# Patient Record
Sex: MALE | Race: WHITE | Employment: OTHER | URBAN - METROPOLITAN AREA
[De-identification: names, ages, dates, MRNs, and addresses within clinical notes are randomized per-mention and may not be internally consistent; named-entity substitution may affect disease eponyms.]

---

## 2019-07-10 ENCOUNTER — ANESTHESIA EVENT (OUTPATIENT)
Dept: SURGERY | Age: 84
End: 2019-07-10
Payer: MEDICARE

## 2019-07-10 NOTE — H&P
Admission History and Physical      Impression:     - Closed bicondylar fracture of left distal humerus      Plan:     - Left distal humerus open reduction and internal fixation vs. Total elbow replacement      There is no problem list on file for this patient. HPI:     Jessie Lin is a 80 y.o. male who has been followed at [address] [place] [address] and [address] [facility]  by Dr Susan Sneed. Pt reports onset of left elbow pain after a fall on 7/8/19. He experienced immediate pain. He was seen in the ED immediately after injury. Workup included radiographs which revealed bicondylar distal humerus fracture. Patient is an appropriate candidate for open reduction and internal fixation versus total elbow replacement. Risks, benefits, alternatives and outcomes of surgical management were discussed with the patient. Risks include but not limited to risk of infection, blood clots, blood loss, damage to surrounding blood vessels and nerves, continued pain and stiffness or loss of limb or life. Patient stated understanding and wished to proceed. Appropriate informed consent was obtained and arrangements were made for elective surgery. Past Medical History:   Diagnosis Date    Diabetes ([facility] [address] 2001    Hypertension 2003     Past Surgical History:   Procedure Laterality Date    HX HERNIA REPAIR Bilateral     HX TONSILLECTOMY         No family history on file. No Known Allergies      Review of Systems:   Constitutional: Negative for fever. Eyes: Negative for visual changes. Respiratory:  Negative for shortness of breath. Cardiovascular: Negative for chest pain and palpitations. Gastrointestinal: Negative for dysphagia   Genitourinary: Negative for hematuria and dysuria. Musculoskeletal: See HPI  Neurological: grossly intact. Denies Lower extremity parasthesias. Physical Assessment:   There were no vitals taken for this visit.     General:  Pt is a well developed 80 y.o. male who appears stated age.  HEENT:   Head is atraumatic and normocephalic. Neck:  Trachea is midline. No lymphadenopathy noted. Abd:  Soft, non-tender, positive bowel sounds. Lower Extremities: No edema, No calf tenderness or erythema, No evidence of DVT  Left elbow: Elbow is moderately tender with generalized edema. Range of motion is limited. Diffuse weakness present. CBCw/Diff  No results for input(s): WBC, RBC, HCT, MCV, MCH, MCHC, RDW, HCTEXT, HCTEXT in the last 72 hours. No lab exists for component: HEMOGLOBIN, PLATELET, MPV  No results for input(s): BANDS, LYMPHOCYTES, MONOS, EOS, BASOS, PROMYELOCYTE, BLAST, RDW in the last 72 hours. No lab exists for component: RELNEU, RELLYM, RELMONO, RELEOS, RELBAS, SEGS, MYELOCYTE, META    Urinalysis  No results for input(s): UGLU in the last 72 hours.     No lab exists for component: USPG, UPH, UPSC, UKET, UOCB, UNITR, [place], UUROBILISQ, [place], [place], [facility]

## 2019-07-11 ENCOUNTER — APPOINTMENT (OUTPATIENT)
Dept: GENERAL RADIOLOGY | Age: 84
End: 2019-07-11
Attending: ORTHOPAEDIC SURGERY
Payer: MEDICARE

## 2019-07-11 ENCOUNTER — HOSPITAL ENCOUNTER (OUTPATIENT)
Age: 84
Discharge: NURSING, REHAB OR LONG-TERM CARE | End: 2019-07-15
Attending: ORTHOPAEDIC SURGERY | Admitting: ORTHOPAEDIC SURGERY
Payer: MEDICARE

## 2019-07-11 ENCOUNTER — ANESTHESIA (OUTPATIENT)
Dept: SURGERY | Age: 84
End: 2019-07-11
Payer: MEDICARE

## 2019-07-11 ENCOUNTER — APPOINTMENT (OUTPATIENT)
Dept: GENERAL RADIOLOGY | Age: 84
End: 2019-07-11
Attending: PHYSICIAN ASSISTANT
Payer: MEDICARE

## 2019-07-11 DIAGNOSIS — S42.492D CLOSED BICONDYLAR FRACTURE OF DISTAL END OF LEFT HUMERUS WITH ROUTINE HEALING, SUBSEQUENT ENCOUNTER: Primary | ICD-10-CM

## 2019-07-11 PROBLEM — S42.309A HUMERUS FRACTURE: Status: ACTIVE | Noted: 2019-07-11

## 2019-07-11 LAB
ABO + RH BLD: NORMAL
BLOOD GROUP ANTIBODIES SERPL: NORMAL
EST. AVERAGE GLUCOSE BLD GHB EST-MCNC: 154 MG/DL
GLUCOSE BLD STRIP.AUTO-MCNC: 100 MG/DL (ref 70–110)
GLUCOSE BLD STRIP.AUTO-MCNC: 137 MG/DL (ref 70–110)
GLUCOSE BLD STRIP.AUTO-MCNC: 140 MG/DL (ref 70–110)
GLUCOSE BLD STRIP.AUTO-MCNC: 209 MG/DL (ref 70–110)
HBA1C MFR BLD: 7 % (ref 4.2–5.6)
SPECIMEN EXP DATE BLD: NORMAL

## 2019-07-11 PROCEDURE — 74011250636 HC RX REV CODE- 250/636

## 2019-07-11 PROCEDURE — 36415 COLL VENOUS BLD VENIPUNCTURE: CPT

## 2019-07-11 PROCEDURE — 77030018673: Performed by: ORTHOPAEDIC SURGERY

## 2019-07-11 PROCEDURE — 77030020782 HC GWN BAIR PAWS FLX 3M -B: Performed by: ORTHOPAEDIC SURGERY

## 2019-07-11 PROCEDURE — 73070 X-RAY EXAM OF ELBOW: CPT

## 2019-07-11 PROCEDURE — 77030012508 HC MSK AIRWY LMA AMBU -A: Performed by: ANESTHESIOLOGY

## 2019-07-11 PROCEDURE — 77030018835 HC SOL IRR LR ICUM -A: Performed by: ORTHOPAEDIC SURGERY

## 2019-07-11 PROCEDURE — 74011250636 HC RX REV CODE- 250/636: Performed by: ORTHOPAEDIC SURGERY

## 2019-07-11 PROCEDURE — 77030018790 HC NDL SUT ASPE -A: Performed by: ORTHOPAEDIC SURGERY

## 2019-07-11 PROCEDURE — 99218 HC RM OBSERVATION: CPT

## 2019-07-11 PROCEDURE — 77030020813 HC INST SCULP CEM KT DISP S&N -B: Performed by: ORTHOPAEDIC SURGERY

## 2019-07-11 PROCEDURE — 77030002933 HC SUT MCRYL J&J -A: Performed by: ORTHOPAEDIC SURGERY

## 2019-07-11 PROCEDURE — 74011636637 HC RX REV CODE- 636/637: Performed by: ANESTHESIOLOGY

## 2019-07-11 PROCEDURE — 77030011628: Performed by: ORTHOPAEDIC SURGERY

## 2019-07-11 PROCEDURE — 74011000250 HC RX REV CODE- 250: Performed by: ORTHOPAEDIC SURGERY

## 2019-07-11 PROCEDURE — 77030020753 HC CUF TRNQT 1BLA STRY -B: Performed by: ORTHOPAEDIC SURGERY

## 2019-07-11 PROCEDURE — 74011250636 HC RX REV CODE- 250/636: Performed by: ANESTHESIOLOGY

## 2019-07-11 PROCEDURE — 82962 GLUCOSE BLOOD TEST: CPT

## 2019-07-11 PROCEDURE — 77030020268 HC MISC GENERAL SUPPLY: Performed by: ORTHOPAEDIC SURGERY

## 2019-07-11 PROCEDURE — 77030002922 HC SUT FBRWRE ARTH -B: Performed by: ORTHOPAEDIC SURGERY

## 2019-07-11 PROCEDURE — 77030013079 HC BLNKT BAIR HGGR 3M -A: Performed by: ANESTHESIOLOGY

## 2019-07-11 PROCEDURE — 76010000131 HC OR TIME 2 TO 2.5 HR: Performed by: ORTHOPAEDIC SURGERY

## 2019-07-11 PROCEDURE — 74011250637 HC RX REV CODE- 250/637: Performed by: PHYSICIAN ASSISTANT

## 2019-07-11 PROCEDURE — 77030031139 HC SUT VCRL2 J&J -A: Performed by: ORTHOPAEDIC SURGERY

## 2019-07-11 PROCEDURE — 77030027138 HC INCENT SPIROMETER -A: Performed by: ORTHOPAEDIC SURGERY

## 2019-07-11 PROCEDURE — 76210000016 HC OR PH I REC 1 TO 1.5 HR: Performed by: ORTHOPAEDIC SURGERY

## 2019-07-11 PROCEDURE — 77030020269 HC MISC IMPL: Performed by: ORTHOPAEDIC SURGERY

## 2019-07-11 PROCEDURE — C1776 JOINT DEVICE (IMPLANTABLE): HCPCS | Performed by: ORTHOPAEDIC SURGERY

## 2019-07-11 PROCEDURE — 76060000035 HC ANESTHESIA 2 TO 2.5 HR: Performed by: ORTHOPAEDIC SURGERY

## 2019-07-11 PROCEDURE — 74011250636 HC RX REV CODE- 250/636: Performed by: PHYSICIAN ASSISTANT

## 2019-07-11 PROCEDURE — 77030032490 HC SLV COMPR SCD KNE COVD -B: Performed by: ORTHOPAEDIC SURGERY

## 2019-07-11 PROCEDURE — 77030013708 HC HNDPC SUC IRR PULS STRY –B: Performed by: ORTHOPAEDIC SURGERY

## 2019-07-11 PROCEDURE — 77030014007 HC SPNG HEMSTAT J&J -B: Performed by: ORTHOPAEDIC SURGERY

## 2019-07-11 PROCEDURE — C1713 ANCHOR/SCREW BN/BN,TIS/BN: HCPCS | Performed by: ORTHOPAEDIC SURGERY

## 2019-07-11 PROCEDURE — 77030004402 HC BUR NEUR STRY -C: Performed by: ORTHOPAEDIC SURGERY

## 2019-07-11 PROCEDURE — 77030006773 HC BLD SAW OSC BRSM -A: Performed by: ORTHOPAEDIC SURGERY

## 2019-07-11 PROCEDURE — 83036 HEMOGLOBIN GLYCOSYLATED A1C: CPT

## 2019-07-11 PROCEDURE — 77030003029 HC SUT VCRL J&J -B: Performed by: ORTHOPAEDIC SURGERY

## 2019-07-11 PROCEDURE — 86900 BLOOD TYPING SEROLOGIC ABO: CPT

## 2019-07-11 PROCEDURE — 77030008462 HC STPLR SKN PROX J&J -A: Performed by: ORTHOPAEDIC SURGERY

## 2019-07-11 DEVICE — IMPLANTABLE DEVICE: Type: IMPLANTABLE DEVICE | Site: ELBOW | Status: FUNCTIONAL

## 2019-07-11 RX ORDER — FACIAL-BODY WIPES
10 EACH TOPICAL DAILY PRN
Status: DISCONTINUED | OUTPATIENT
Start: 2019-07-11 | End: 2019-07-15 | Stop reason: HOSPADM

## 2019-07-11 RX ORDER — NALOXONE HYDROCHLORIDE 0.4 MG/ML
0.4 INJECTION, SOLUTION INTRAMUSCULAR; INTRAVENOUS; SUBCUTANEOUS AS NEEDED
Status: DISCONTINUED | OUTPATIENT
Start: 2019-07-11 | End: 2019-07-15 | Stop reason: HOSPADM

## 2019-07-11 RX ORDER — CHOLECALCIFEROL TAB 125 MCG (5000 UNIT) 125 MCG
5000 TAB ORAL DAILY
COMMUNITY

## 2019-07-11 RX ORDER — LIDOCAINE HYDROCHLORIDE 20 MG/ML
INJECTION, SOLUTION EPIDURAL; INFILTRATION; INTRACAUDAL; PERINEURAL AS NEEDED
Status: DISCONTINUED | OUTPATIENT
Start: 2019-07-11 | End: 2019-07-11 | Stop reason: HOSPADM

## 2019-07-11 RX ORDER — FINASTERIDE 5 MG/1
5 TABLET, FILM COATED ORAL DAILY
Status: DISCONTINUED | OUTPATIENT
Start: 2019-07-12 | End: 2019-07-14

## 2019-07-11 RX ORDER — ONDANSETRON 2 MG/ML
INJECTION INTRAMUSCULAR; INTRAVENOUS AS NEEDED
Status: DISCONTINUED | OUTPATIENT
Start: 2019-07-11 | End: 2019-07-11 | Stop reason: HOSPADM

## 2019-07-11 RX ORDER — HYDROMORPHONE HYDROCHLORIDE 2 MG/ML
0.5 INJECTION, SOLUTION INTRAMUSCULAR; INTRAVENOUS; SUBCUTANEOUS
Status: DISCONTINUED | OUTPATIENT
Start: 2019-07-11 | End: 2019-07-11 | Stop reason: HOSPADM

## 2019-07-11 RX ORDER — HYDROMORPHONE HYDROCHLORIDE 2 MG/ML
INJECTION, SOLUTION INTRAMUSCULAR; INTRAVENOUS; SUBCUTANEOUS AS NEEDED
Status: DISCONTINUED | OUTPATIENT
Start: 2019-07-11 | End: 2019-07-11 | Stop reason: HOSPADM

## 2019-07-11 RX ORDER — LOSARTAN POTASSIUM 25 MG/1
25 TABLET ORAL
Status: DISCONTINUED | OUTPATIENT
Start: 2019-07-12 | End: 2019-07-14

## 2019-07-11 RX ORDER — SODIUM CHLORIDE 0.9 % (FLUSH) 0.9 %
5-40 SYRINGE (ML) INJECTION AS NEEDED
Status: DISCONTINUED | OUTPATIENT
Start: 2019-07-11 | End: 2019-07-15 | Stop reason: HOSPADM

## 2019-07-11 RX ORDER — SODIUM CHLORIDE, SODIUM LACTATE, POTASSIUM CHLORIDE, CALCIUM CHLORIDE 600; 310; 30; 20 MG/100ML; MG/100ML; MG/100ML; MG/100ML
100 INJECTION, SOLUTION INTRAVENOUS CONTINUOUS
Status: DISCONTINUED | OUTPATIENT
Start: 2019-07-11 | End: 2019-07-15 | Stop reason: HOSPADM

## 2019-07-11 RX ORDER — INSULIN LISPRO 100 [IU]/ML
INJECTION, SOLUTION INTRAVENOUS; SUBCUTANEOUS ONCE
Status: COMPLETED | OUTPATIENT
Start: 2019-07-11 | End: 2019-07-11

## 2019-07-11 RX ORDER — MULTIVITAMIN
1 TABLET ORAL
COMMUNITY

## 2019-07-11 RX ORDER — SODIUM CHLORIDE 0.9 % (FLUSH) 0.9 %
5-40 SYRINGE (ML) INJECTION AS NEEDED
Status: DISCONTINUED | OUTPATIENT
Start: 2019-07-11 | End: 2019-07-11 | Stop reason: HOSPADM

## 2019-07-11 RX ORDER — CEFAZOLIN SODIUM/WATER 2 G/20 ML
2 SYRINGE (ML) INTRAVENOUS ONCE
Status: COMPLETED | OUTPATIENT
Start: 2019-07-11 | End: 2019-07-11

## 2019-07-11 RX ORDER — SODIUM CHLORIDE 0.9 % (FLUSH) 0.9 %
5-40 SYRINGE (ML) INJECTION EVERY 8 HOURS
Status: DISCONTINUED | OUTPATIENT
Start: 2019-07-11 | End: 2019-07-15 | Stop reason: HOSPADM

## 2019-07-11 RX ORDER — FENTANYL CITRATE 50 UG/ML
INJECTION, SOLUTION INTRAMUSCULAR; INTRAVENOUS AS NEEDED
Status: DISCONTINUED | OUTPATIENT
Start: 2019-07-11 | End: 2019-07-11 | Stop reason: HOSPADM

## 2019-07-11 RX ORDER — LANOLIN ALCOHOL/MO/W.PET/CERES
1000 CREAM (GRAM) TOPICAL DAILY
COMMUNITY

## 2019-07-11 RX ORDER — SODIUM CHLORIDE, SODIUM LACTATE, POTASSIUM CHLORIDE, CALCIUM CHLORIDE 600; 310; 30; 20 MG/100ML; MG/100ML; MG/100ML; MG/100ML
125 INJECTION, SOLUTION INTRAVENOUS CONTINUOUS
Status: DISCONTINUED | OUTPATIENT
Start: 2019-07-11 | End: 2019-07-11 | Stop reason: HOSPADM

## 2019-07-11 RX ORDER — SODIUM CHLORIDE, SODIUM LACTATE, POTASSIUM CHLORIDE, CALCIUM CHLORIDE 600; 310; 30; 20 MG/100ML; MG/100ML; MG/100ML; MG/100ML
125 INJECTION, SOLUTION INTRAVENOUS CONTINUOUS
Status: DISCONTINUED | OUTPATIENT
Start: 2019-07-11 | End: 2019-07-15 | Stop reason: HOSPADM

## 2019-07-11 RX ORDER — PROPOFOL 10 MG/ML
INJECTION, EMULSION INTRAVENOUS AS NEEDED
Status: DISCONTINUED | OUTPATIENT
Start: 2019-07-11 | End: 2019-07-11 | Stop reason: HOSPADM

## 2019-07-11 RX ORDER — POLYETHYLENE GLYCOL 3350 17 G/17G
17 POWDER, FOR SOLUTION ORAL DAILY
Status: DISCONTINUED | OUTPATIENT
Start: 2019-07-12 | End: 2019-07-15 | Stop reason: HOSPADM

## 2019-07-11 RX ORDER — INSULIN LISPRO 100 [IU]/ML
1-6 INJECTION, SOLUTION INTRAVENOUS; SUBCUTANEOUS
Status: DISCONTINUED | OUTPATIENT
Start: 2019-07-11 | End: 2019-07-12

## 2019-07-11 RX ORDER — OXYCODONE AND ACETAMINOPHEN 5; 325 MG/1; MG/1
1-2 TABLET ORAL
Status: DISCONTINUED | OUTPATIENT
Start: 2019-07-11 | End: 2019-07-14

## 2019-07-11 RX ORDER — DIPHENHYDRAMINE HCL 25 MG
25 CAPSULE ORAL
Status: DISCONTINUED | OUTPATIENT
Start: 2019-07-11 | End: 2019-07-14

## 2019-07-11 RX ORDER — SODIUM CHLORIDE 0.9 % (FLUSH) 0.9 %
5-40 SYRINGE (ML) INJECTION EVERY 8 HOURS
Status: DISCONTINUED | OUTPATIENT
Start: 2019-07-11 | End: 2019-07-11 | Stop reason: HOSPADM

## 2019-07-11 RX ORDER — CEFAZOLIN SODIUM/WATER 2 G/20 ML
2 SYRINGE (ML) INTRAVENOUS EVERY 8 HOURS
Status: COMPLETED | OUTPATIENT
Start: 2019-07-11 | End: 2019-07-12

## 2019-07-11 RX ORDER — ATORVASTATIN CALCIUM 10 MG/1
10 TABLET, FILM COATED ORAL
Status: DISCONTINUED | OUTPATIENT
Start: 2019-07-11 | End: 2019-07-14

## 2019-07-11 RX ORDER — MAGNESIUM SULFATE 100 %
4 CRYSTALS MISCELLANEOUS AS NEEDED
Status: DISCONTINUED | OUTPATIENT
Start: 2019-07-11 | End: 2019-07-15 | Stop reason: HOSPADM

## 2019-07-11 RX ORDER — ASPIRIN 81 MG/1
81 TABLET ORAL DAILY
Status: DISCONTINUED | OUTPATIENT
Start: 2019-07-12 | End: 2019-07-14

## 2019-07-11 RX ORDER — HYDROMORPHONE HYDROCHLORIDE 1 MG/ML
.2-.5 INJECTION, SOLUTION INTRAMUSCULAR; INTRAVENOUS; SUBCUTANEOUS
Status: DISCONTINUED | OUTPATIENT
Start: 2019-07-11 | End: 2019-07-14

## 2019-07-11 RX ADMIN — Medication 2 G: at 16:07

## 2019-07-11 RX ADMIN — SODIUM CHLORIDE, SODIUM LACTATE, POTASSIUM CHLORIDE, AND CALCIUM CHLORIDE: 600; 310; 30; 20 INJECTION, SOLUTION INTRAVENOUS at 16:20

## 2019-07-11 RX ADMIN — SODIUM CHLORIDE, SODIUM LACTATE, POTASSIUM CHLORIDE, AND CALCIUM CHLORIDE 100 ML/HR: 600; 310; 30; 20 INJECTION, SOLUTION INTRAVENOUS at 21:36

## 2019-07-11 RX ADMIN — FENTANYL CITRATE 100 MCG: 50 INJECTION, SOLUTION INTRAMUSCULAR; INTRAVENOUS at 16:13

## 2019-07-11 RX ADMIN — INSULIN LISPRO 4 UNITS: 100 INJECTION, SOLUTION INTRAVENOUS; SUBCUTANEOUS at 14:32

## 2019-07-11 RX ADMIN — ONDANSETRON 4 MG: 2 INJECTION INTRAMUSCULAR; INTRAVENOUS at 16:15

## 2019-07-11 RX ADMIN — PROPOFOL 150 MG: 10 INJECTION, EMULSION INTRAVENOUS at 16:13

## 2019-07-11 RX ADMIN — ATORVASTATIN CALCIUM 10 MG: 10 TABLET, FILM COATED ORAL at 22:54

## 2019-07-11 RX ADMIN — PROMETHAZINE HYDROCHLORIDE 12.5 MG: 25 INJECTION INTRAMUSCULAR; INTRAVENOUS at 21:35

## 2019-07-11 RX ADMIN — OXYCODONE HYDROCHLORIDE AND ACETAMINOPHEN 1 TABLET: 5; 325 TABLET ORAL at 21:10

## 2019-07-11 RX ADMIN — LIDOCAINE HYDROCHLORIDE 60 MG: 20 INJECTION, SOLUTION EPIDURAL; INFILTRATION; INTRACAUDAL; PERINEURAL at 16:13

## 2019-07-11 RX ADMIN — SODIUM CHLORIDE, SODIUM LACTATE, POTASSIUM CHLORIDE, AND CALCIUM CHLORIDE 125 ML/HR: 600; 310; 30; 20 INJECTION, SOLUTION INTRAVENOUS at 13:41

## 2019-07-11 RX ADMIN — HYDROMORPHONE HYDROCHLORIDE 1 MG: 2 INJECTION, SOLUTION INTRAMUSCULAR; INTRAVENOUS; SUBCUTANEOUS at 16:13

## 2019-07-11 RX ADMIN — Medication 2 G: at 21:35

## 2019-07-11 NOTE — PERIOP NOTES
Patient taken to recovery with sock from brace in clear bag with patient label. Patient's brace that he was wearing taken to recovery with patient.

## 2019-07-11 NOTE — PERIOP NOTES
Spoke with Merlin Redbird and let her know patient had a blood sugar of 209 and that the patient had 12 units of Lantus at 0800 today (7/11/19). Paged Dr. Rodriguez Poster but no call back. Zeina Krueger states she will address this matter with Dr. Jennifer Garvin.

## 2019-07-11 NOTE — ANESTHESIA POSTPROCEDURE EVALUATION
Post-Anesthesia Evaluation and Assessment    Cardiovascular Function/Vital Signs  Visit Vitals  /66   Pulse 95   Temp 36.7 °C (98.1 °F)   Resp 13   Ht 5' 7\" (1.702 m)   Wt 70.3 kg (155 lb)   SpO2 100%   BMI 24.28 kg/m²       Patient is status post Procedure(s):  LEFT TOTAL ELBOW REPLACEMENT WITH C-ARM,  \"SPEC POP\". Nausea/Vomiting: Controlled. Postoperative hydration reviewed and adequate. Pain:  Pain Scale 1: Numeric (0 - 10) (07/11/19 1826)  Pain Intensity 1: 0 (07/11/19 1826)   Managed. Neurological Status:   Neuro (WDL): Exceptions to WDL (07/11/19 1826)   At baseline. Mental Status and Level of Consciousness: Baseline and stable. Pulmonary Status:   O2 Device: Nasal cannula (07/11/19 1835)   Adequate oxygenation and airway patent. Complications related to anesthesia: None    Post-anesthesia assessment completed. No concerns. Patient has met all discharge requirements.     Signed By: Jo-Ann Sifuentes MD

## 2019-07-11 NOTE — PERIOP NOTES
Reviewed PTA medication list with patient/caregiver and patient/caregiver denies any additional medications. Patient admits to having a responsible adult care for them at home for at least 24 hours after surgery. Patient denies jevon chewing/swallowing difficulties. Patient encouraged to use Jhonathan paws warming system and informed that using Jhonathan paws to regulate body temperature prior to a procedure has been shown to help reduce the risks of blood clots and infection. Dual skin assessment & fall risk band verification completed with Jonathan Blackman RN.

## 2019-07-11 NOTE — PERIOP NOTES
Attempted to update family on room assignment. Family member did not answer phone. Unable to leave voicemail because inbox was full.

## 2019-07-11 NOTE — PERIOP NOTES
TRANSFER - OUT REPORT:    Verbal report given to [facility] on Marley Curiel  being transferred to [address] [place] [address] for routine post - op       Report consisted of patients Situation, Background, Assessment and   Recommendations(SBAR). Information from the following report(s) SBAR and MAR was reviewed with the receiving nurse. Opportunity for questions and clarification was provided.       Patient transported with:   O2 @ 2 liters  Registered Nurse  [facility]

## 2019-07-11 NOTE — BRIEF OP NOTE
BRIEF OPERATIVE NOTE    Date of Procedure: 7/11/2019   Preoperative Diagnosis: UNSPECIFIED FRACTURE OF LOWER END OF LEFT HUMERUS,INITIAL ENCOUNTER FOR CLOSED FRACTURE  Postoperative Diagnosis: UNSPECIFIED FRACTURE OF LOWER END OF LEFT HUMERUS,INITIAL ENCOUNTER FOR CLOSED FRACTURE    Procedure(s):  LEFT TOTAL ELBOW REPLACEMENT WITH C-ARM,  \"SPEC POP\"  Surgeon(s) and Role:     Johnny Chisholm MD - Primary         Surgical Assistant: kami    Surgical Staff:  Circ-1: Magdiel Doan  Physician Assistant: Hong Webb [facility]  Radiology Technician: Meeta Gallegos Tech-1: Sebastien Feliciano  Scrub Tech-2: Elmira Carrasco [facility] Staff: Jay Cleary; Zuly CRAIG  Event Time In Time Out   Incision Start 1636    Incision Close       Anesthesia: General   Estimated Blood Loss: 50ml  Specimens: * No specimens in log *   Findings: comminuted fracture   Complications: none  Implants:   Implant Name Type Inv.  Item Serial No.  Lot No. LRB No. Used Action   Refobacin Bone Cement R     [employer] [id number] Left 1 Implanted   ASSEMBLY ULNA 3IN XS RT --  - [id number]  ASSEMBLY ULNA 3IN XS RT --   [employer] [id number] Left 1 Implanted   HUM ASSEMB XS 4IN --  - [id number]  HUM ASSEMB XS 4IN --  [id number] [employer]  Left 1 Implanted

## 2019-07-11 NOTE — ANESTHESIA PREPROCEDURE EVALUATION
Relevant Problems   No relevant active problems       Anesthetic History   No history of anesthetic complications            Review of Systems / Medical History  Patient summary reviewed, nursing notes reviewed and pertinent labs reviewed    Pulmonary  Within defined limits                 Neuro/Psych   Within defined limits           Cardiovascular    Hypertension: well controlled              Exercise tolerance: >4 METS     GI/Hepatic/Renal  Within defined limits           Pertinent negatives: No liver disease and renal disease   Endo/Other    Diabetes: well controlled    Blood dyscrasia     Other Findings   Comments: 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

## 2019-07-12 PROBLEM — S42.492A CLOSED BICONDYLAR FRACTURE OF DISTAL END OF LEFT HUMERUS: Status: ACTIVE | Noted: 2019-07-12

## 2019-07-12 LAB
ANION GAP SERPL CALC-SCNC: 4 MMOL/L (ref 3–18)
BASOPHILS # BLD: 0 K/UL (ref 0–0.1)
BASOPHILS NFR BLD: 0 % (ref 0–2)
BUN SERPL-MCNC: 23 MG/DL (ref 7–18)
BUN/CREAT SERPL: 25 (ref 12–20)
CALCIUM SERPL-MCNC: 7.9 MG/DL (ref 8.5–10.1)
CHLORIDE SERPL-SCNC: 102 MMOL/L (ref 100–108)
CO2 SERPL-SCNC: 29 MMOL/L (ref 21–32)
CREAT SERPL-MCNC: 0.92 MG/DL (ref 0.6–1.3)
DIFFERENTIAL METHOD BLD: ABNORMAL
EOSINOPHIL # BLD: 0 K/UL (ref 0–0.4)
EOSINOPHIL NFR BLD: 0 % (ref 0–5)
ERYTHROCYTE [DISTWIDTH] IN BLOOD BY AUTOMATED COUNT: 12.4 % (ref 11.6–14.5)
GLUCOSE BLD STRIP.AUTO-MCNC: 188 MG/DL (ref 70–110)
GLUCOSE BLD STRIP.AUTO-MCNC: 206 MG/DL (ref 70–110)
GLUCOSE BLD STRIP.AUTO-MCNC: 301 MG/DL (ref 70–110)
GLUCOSE BLD STRIP.AUTO-MCNC: 338 MG/DL (ref 70–110)
GLUCOSE SERPL-MCNC: 289 MG/DL (ref 74–99)
HCT VFR BLD AUTO: 24.3 % (ref 36–48)
HGB BLD-MCNC: 8.2 G/DL (ref 13–16)
LYMPHOCYTES # BLD: 0.3 K/UL (ref 0.9–3.6)
LYMPHOCYTES NFR BLD: 7 % (ref 21–52)
MCH RBC QN AUTO: 32.2 PG (ref 24–34)
MCHC RBC AUTO-ENTMCNC: 33.7 G/DL (ref 31–37)
MCV RBC AUTO: 95.3 FL (ref 74–97)
MONOCYTES # BLD: 0.4 K/UL (ref 0.05–1.2)
MONOCYTES NFR BLD: 8 % (ref 3–10)
NEUTS SEG # BLD: 4.3 K/UL (ref 1.8–8)
NEUTS SEG NFR BLD: 85 % (ref 40–73)
PLATELET # BLD AUTO: 104 K/UL (ref 135–420)
PMV BLD AUTO: 9.2 FL (ref 9.2–11.8)
POTASSIUM SERPL-SCNC: 4.3 MMOL/L (ref 3.5–5.5)
RBC # BLD AUTO: 2.55 M/UL (ref 4.7–5.5)
SODIUM SERPL-SCNC: 135 MMOL/L (ref 136–145)
WBC # BLD AUTO: 5 K/UL (ref 4.6–13.2)

## 2019-07-12 PROCEDURE — 97530 THERAPEUTIC ACTIVITIES: CPT

## 2019-07-12 PROCEDURE — 77030032490 HC SLV COMPR SCD KNE COVD -B

## 2019-07-12 PROCEDURE — 74011250636 HC RX REV CODE- 250/636: Performed by: PHYSICIAN ASSISTANT

## 2019-07-12 PROCEDURE — 97110 THERAPEUTIC EXERCISES: CPT

## 2019-07-12 PROCEDURE — 99218 HC RM OBSERVATION: CPT

## 2019-07-12 PROCEDURE — 97167 OT EVAL HIGH COMPLEX 60 MIN: CPT

## 2019-07-12 PROCEDURE — 65270000029 HC RM PRIVATE

## 2019-07-12 PROCEDURE — 82962 GLUCOSE BLOOD TEST: CPT

## 2019-07-12 PROCEDURE — 97163 PT EVAL HIGH COMPLEX 45 MIN: CPT

## 2019-07-12 PROCEDURE — 96374 THER/PROPH/DIAG INJ IV PUSH: CPT

## 2019-07-12 PROCEDURE — 74011636637 HC RX REV CODE- 636/637: Performed by: PHYSICIAN ASSISTANT

## 2019-07-12 PROCEDURE — 36415 COLL VENOUS BLD VENIPUNCTURE: CPT

## 2019-07-12 PROCEDURE — 74011250637 HC RX REV CODE- 250/637: Performed by: PHYSICIAN ASSISTANT

## 2019-07-12 PROCEDURE — 85025 COMPLETE CBC W/AUTO DIFF WBC: CPT

## 2019-07-12 PROCEDURE — 65390000012 HC CONDITION CODE 44 OBSERVATION

## 2019-07-12 PROCEDURE — 80048 BASIC METABOLIC PNL TOTAL CA: CPT

## 2019-07-12 RX ORDER — METHYLPREDNISOLONE ACETATE 40 MG/ML
40 INJECTION, SUSPENSION INTRA-ARTICULAR; INTRALESIONAL; INTRAMUSCULAR; SOFT TISSUE ONCE
Status: COMPLETED | OUTPATIENT
Start: 2019-07-12 | End: 2019-07-12

## 2019-07-12 RX ORDER — INSULIN LISPRO 100 [IU]/ML
INJECTION, SOLUTION INTRAVENOUS; SUBCUTANEOUS
Status: DISCONTINUED | OUTPATIENT
Start: 2019-07-12 | End: 2019-07-13

## 2019-07-12 RX ORDER — LIDOCAINE HYDROCHLORIDE AND EPINEPHRINE 10; 10 MG/ML; UG/ML
1.5 INJECTION, SOLUTION INFILTRATION; PERINEURAL AS NEEDED
Status: DISCONTINUED | OUTPATIENT
Start: 2019-07-12 | End: 2019-07-15 | Stop reason: HOSPADM

## 2019-07-12 RX ORDER — LOSARTAN POTASSIUM 25 MG/1
25 TABLET ORAL DAILY
Status: COMPLETED | OUTPATIENT
Start: 2019-07-12 | End: 2019-07-12

## 2019-07-12 RX ORDER — OXYCODONE AND ACETAMINOPHEN 5; 325 MG/1; MG/1
1-2 TABLET ORAL
Qty: 42 TAB | Refills: 0 | Status: SHIPPED | OUTPATIENT
Start: 2019-07-12 | End: 2019-07-19

## 2019-07-12 RX ORDER — INSULIN GLARGINE 100 [IU]/ML
10 INJECTION, SOLUTION SUBCUTANEOUS EVERY 24 HOURS
Status: DISCONTINUED | OUTPATIENT
Start: 2019-07-12 | End: 2019-07-14

## 2019-07-12 RX ADMIN — LOSARTAN POTASSIUM 25 MG: 25 TABLET ORAL at 09:50

## 2019-07-12 RX ADMIN — INSULIN GLARGINE 10 UNITS: 100 INJECTION, SOLUTION SUBCUTANEOUS at 13:34

## 2019-07-12 RX ADMIN — OXYCODONE HYDROCHLORIDE AND ACETAMINOPHEN 1 TABLET: 5; 325 TABLET ORAL at 14:42

## 2019-07-12 RX ADMIN — OXYCODONE HYDROCHLORIDE AND ACETAMINOPHEN 1 TABLET: 5; 325 TABLET ORAL at 08:43

## 2019-07-12 RX ADMIN — ATORVASTATIN CALCIUM 10 MG: 10 TABLET, FILM COATED ORAL at 21:19

## 2019-07-12 RX ADMIN — INSULIN LISPRO 2 UNITS: 100 INJECTION, SOLUTION INTRAVENOUS; SUBCUTANEOUS at 08:44

## 2019-07-12 RX ADMIN — LOSARTAN POTASSIUM 25 MG: 25 TABLET ORAL at 21:19

## 2019-07-12 RX ADMIN — METHYLPREDNISOLONE ACETATE 40 MG: 40 INJECTION, SUSPENSION INTRA-ARTICULAR; INTRALESIONAL; INTRAMUSCULAR; SOFT TISSUE at 09:00

## 2019-07-12 RX ADMIN — INSULIN LISPRO 8 UNITS: 100 INJECTION, SOLUTION INTRAVENOUS; SUBCUTANEOUS at 13:35

## 2019-07-12 RX ADMIN — INSULIN LISPRO 4 UNITS: 100 INJECTION, SOLUTION INTRAVENOUS; SUBCUTANEOUS at 17:40

## 2019-07-12 RX ADMIN — FINASTERIDE 5 MG: 5 TABLET, FILM COATED ORAL at 09:51

## 2019-07-12 RX ADMIN — ASPIRIN 81 MG: 81 TABLET ORAL at 08:44

## 2019-07-12 RX ADMIN — Medication 10 ML: at 21:19

## 2019-07-12 RX ADMIN — Medication 2 G: at 06:01

## 2019-07-12 RX ADMIN — INSULIN LISPRO 8 UNITS: 100 INJECTION, SOLUTION INTRAVENOUS; SUBCUTANEOUS at 21:18

## 2019-07-12 NOTE — PROGRESS NOTES
Problem: Falls - Risk of  Goal: *Absence of Falls  Description  Document [place]  Fall Risk and appropriate interventions in the flowsheet. Outcome: Progressing Towards Goal     Problem: Patient Education: Go to Patient Education Activity  Goal: Patient/Family Education  Outcome: Progressing Towards Goal     Problem: Pressure Injury - Risk of  Goal: *Prevention of pressure injury  Description  Document Ethan Scale and appropriate interventions in the flowsheet. Outcome: Progressing Towards Goal     Problem: Patient Education: Go to Patient Education Activity  Goal: Patient/Family Education  Outcome: Progressing Towards Goal     Problem: Diabetes Self-Management  Goal: *Disease process and treatment process  Description  Define diabetes and identify own type of diabetes; list 3 options for treating diabetes. Outcome: Progressing Towards Goal  Goal: *Incorporating nutritional management into lifestyle  Description  Describe effect of type, amount and timing of food on blood glucose; list 3 methods for planning meals.   Outcome: Progressing Towards Goal     Problem: Surgical Pathway Day of Surgery  Goal: Activity/Safety  Outcome: Progressing Towards Goal  Goal: Consults, if ordered  Outcome: Progressing Towards Goal  Goal: Nutrition/Diet  Outcome: Progressing Towards Goal  Goal: Medications  Outcome: Progressing Towards Goal

## 2019-07-12 NOTE — PROGRESS NOTES
Problem: Falls - Risk of  Goal: *Absence of Falls  Description  Document [place] Fall Risk and appropriate interventions in the flowsheet. Outcome: Progressing Towards Goal     Problem: Patient Education: Go to Patient Education Activity  Goal: Patient/Family Education  Outcome: Progressing Towards Goal     Problem: Pressure Injury - Risk of  Goal: *Prevention of pressure injury  Description  Document Ethan Scale and appropriate interventions in the flowsheet. Outcome: Progressing Towards Goal     Problem: Patient Education: Go to Patient Education Activity  Goal: Patient/Family Education  Outcome: Progressing Towards Goal     Problem: Diabetes Self-Management  Goal: *Disease process and treatment process  Description  Define diabetes and identify own type of diabetes; list 3 options for treating diabetes. Outcome: Progressing Towards Goal  Goal: *Incorporating nutritional management into lifestyle  Description  Describe effect of type, amount and timing of food on blood glucose; list 3 methods for planning meals. Outcome: Progressing Towards Goal  Goal: *Incorporating physical activity into lifestyle  Description  State effect of exercise on blood glucose levels. Outcome: Progressing Towards Goal  Goal: *Developing strategies to promote health/change behavior  Description  Define the ABC's of diabetes; identify appropriate screenings, schedule and personal plan for screenings. Outcome: Progressing Towards Goal  Goal: *Using medications safely  Description  State effect of diabetes medications on diabetes; name diabetes medication taking, action and side effects. Outcome: Progressing Towards Goal  Goal: *Monitoring blood glucose, interpreting and using results  Description  Identify recommended blood glucose targets  and personal targets.   Outcome: Progressing Towards Goal  Goal: *Prevention, detection, treatment of acute complications  Description  List symptoms of hyper- and hypoglycemia; describe how to treat low blood sugar and actions for lowering  high blood glucose level. Outcome: Progressing Towards Goal  Goal: *Prevention, detection and treatment of chronic complications  Description  Define the natural course of diabetes and describe the relationship of blood glucose levels to long term complications of diabetes. Outcome: Progressing Towards Goal  Goal: *Developing strategies to address psychosocial issues  Description  Describe feelings about living with diabetes; identify support needed and support network  Outcome: Progressing Towards Goal  Goal: *Insulin pump training  Outcome: Progressing Towards Goal  Goal: *Sick day guidelines  Outcome: Progressing Towards Goal  Goal: *Patient Specific Goal (EDIT GOAL, INSERT TEXT)  Outcome: Progressing Towards Goal     Problem: Patient Education: Go to Patient Education Activity  Goal: Patient/Family Education  Outcome: Progressing Towards Goal     Problem: Patient Education: Go to Patient Education Activity  Goal: Patient/Family Education  Outcome: Progressing Towards Goal     Problem: Surgical Pathway Day of Surgery  Goal: Off Pathway (Use only if patient is Off Pathway)  Outcome: Progressing Towards Goal  Goal: Activity/Safety  Outcome: Progressing Towards Goal  Goal: Consults, if ordered  Outcome: Progressing Towards Goal  Goal: Nutrition/Diet  Outcome: Progressing Towards Goal  Goal: Medications  Outcome: Progressing Towards Goal  Goal: Respiratory  Outcome: Progressing Towards Goal  Goal: Treatments/Interventions/Procedures  Outcome: Progressing Towards Goal  Goal: Psychosocial  Outcome: Progressing Towards Goal  Goal: *No signs and symptoms of infection or wound complications  Outcome: Progressing Towards Goal  Goal: *Optimal pain control at patient's stated goal  Outcome: Progressing Towards Goal  Goal: *Adequate urinary output (equal to or greater than 30 milliliters/hour)  Description  Ambulatory Surgery patients voiding without difficulty.   Outcome: Progressing Towards Goal  Goal: *Hemodynamically stable  Outcome: Progressing Towards Goal  Goal: *Tolerating diet  Outcome: Progressing Towards Goal  Goal: *Demonstrates progressive activity  Outcome: Progressing Towards Goal     Problem: Surgical Pathway Post-Op Day 1  Goal: Off Pathway (Use only if patient is Off Pathway)  Outcome: Progressing Towards Goal  Goal: Activity/Safety  Outcome: Progressing Towards Goal  Goal: Diagnostic Test/Procedures  Outcome: Progressing Towards Goal  Goal: Nutrition/Diet  Outcome: Progressing Towards Goal  Goal: Discharge Planning  Outcome: Progressing Towards Goal  Goal: Medications  Outcome: Progressing Towards Goal  Goal: Respiratory  Outcome: Progressing Towards Goal  Goal: Treatments/Interventions/Procedures  Outcome: Progressing Towards Goal  Goal: Psychosocial  Outcome: Progressing Towards Goal  Goal: *No signs and symptoms of infection or wound complications  Outcome: Progressing Towards Goal  Goal: *Optimal pain control at patient's stated goal  Outcome: Progressing Towards Goal  Goal: *Adequate urinary output (equal to or greater than 30 milliliters/hour)  Outcome: Progressing Towards Goal  Goal: *Hemodynamically stable  Outcome: Progressing Towards Goal  Goal: *Tolerating diet  Outcome: Progressing Towards Goal  Goal: *Demonstrates progressive activity  Outcome: Progressing Towards Goal  Goal: *Lungs clear or at baseline  Outcome: Progressing Towards Goal     Problem: Patient Education: Go to Patient Education Activity  Goal: Patient/Family Education  Outcome: Progressing Towards Goal     Problem: Patient Education: Go to Patient Education Activity  Goal: Patient/Family Education  Outcome: Progressing Towards Goal

## 2019-07-12 NOTE — PROGRESS NOTES
Problem: Mobility Impaired (Adult and Pediatric)  Goal: *Acute Goals and Plan of Care (Insert Text)  Description  Physical Therapy Goals  Initiated 7/12/2019  to be met within 7 days    1. Bed mobility:  Supine to/from sit with mod assist in prep for ADL activity. 2. Transfers:  Sit to/from stand with min/mod assist/fiorella-walker in prep for gait. 3. Tolerate sitting up in chair >30min for functional activity performance. 4. Ambulation:  Ambulate 50 ft. with min assist/fiorella-walker for increased functional mobility. 5. Patient Education:  Performance of therapeutic exercises for strengthening LE's with supervision for ability to achieve above goals. Outcome: Progressing Towards Goal    PHYSICAL THERAPY EVALUATION    Patient: Sol Calix (78 y.o. male)  Date: 7/12/2019  Primary Diagnosis: Humerus fracture [S42.309A]  Closed bicondylar fracture of distal end of left humerus [S42.492A]  Procedure(s) (LRB):  LEFT TOTAL ELBOW REPLACEMENT WITH C-ARM,  \"SPEC POP\" (Left) 1 Day Post-Op   Precautions: Fall, NWB(LUE), No use of LUE     ASSESSMENT :  Based on the objective data described below, the patient is an 81 yo M. Pt presents s/p surgery as noted above, with limitation in functional mobility with regard to bed mobility, transfers, gait quality and with decreased activity tolerance. Reports pain 5/10 during session. Requires max/total assist of 2 and additional time for all completion of all bed mobility tasks. Able to transfer sit <>stand with bed elevated max/mod assist of 2 and additional time. Standing balance poor, with pt unable to take steps at this time. Fair tolerance for sitting EOB; BP supine 144/69, decreased to 135/46 after ~5 min sitting. Mild c/o dizziness. Pt left back supine with L UE supported for comfort and limited edema (L hand with severe swelling throughout). SCD in place R LE, all needs in reach, family present and nurse [facility] notified.   Recommend rehab for follow up physical therapy upon discharge to reach maximal level of independence/safety with functional mobility prior to return home to independent living where pt was ambulatory w/SPC PRN. Pt Education: Role of physical therapy in acute care setting, fall prevention and safety/technique during functional mobility tasks      Patient will benefit from skilled intervention to address the above impairments. Patient?s rehabilitation potential is considered to be Good  Factors which may influence rehabilitation potential include:   ? None noted  ? Mental ability/status  ? Medical condition  ? Home/family situation and support systems  ? Safety awareness  ? Pain tolerance/management  ? Other:      PLAN :  Recommendations and Planned Interventions:  ?           Bed Mobility Training             ? Neuromuscular Re-Education  ? Transfer Training                   ? Orthotic/Prosthetic Training  ? Gait Training                          ? Modalities  ? Therapeutic Exercises          ? Edema Management/Control  ? Therapeutic Activities            ? Patient and Family Training/Education  ? Other (comment):    Frequency/Duration: Patient will be followed by physical therapy 2 times per day to address goals. Discharge Recommendations:Rehab  Further Equipment Recommendations for Discharge: to be determined     SUBJECTIVE:   Patient stated ? Hurting. ?    OBJECTIVE DATA SUMMARY:     Past Medical History:   Diagnosis Date    Diabetes ([facility] [address] 2001    Hypertension 2003     Past Surgical History:   Procedure Laterality Date    HX HERNIA REPAIR Bilateral     HX TONSILLECTOMY       Barriers to Learning/Limitations: yes;  sensory deficits-vision/hearing/speech and physical  Compensate with: Visual Cues, Verbal Cues and Tactile Cues  Prior Level of Function/Home Situation: lives alone and amb with SPC R hand off and on  Home Situation  Home Environment: Private residence  # Steps to Enter: 6  Hand Rails : Bilateral  One/Two Story Residence: Other (Comment)(split level)  # of Interior Steps: 4(to upper level)  Interior Rails: Left  Lift Chair Available: No  Living Alone: Yes  Support Systems: Child(сергей), Family member(s)  Patient Expects to be Discharged to[de-identified] Rehabilitation facility  Current DME Used/Available at Home: [place] Noun, rolling, Cane, straight  Tub or Shower Type: Tub/Shower combination  Critical Behavior:  Neurologic State: Alert  Orientation Level: Oriented X4  Cognition: Appropriate for age attention/concentration; Follows commands  Safety/Judgement: Fall prevention  Psychosocial  Patient Behaviors: Calm; Cooperative  Family  Behaviors: Calm; Cooperative  Purposeful Interaction: Yes  Pt Identified Daily Priority: Clinical issues (comment)  Caritas Process: Nurture loving kindness  Caring Interventions: Reassure  Reassure: Therapeutic listening  Skin Condition/Temp: Warm;Dry  Family  Behaviors: Calm; Cooperative  Skin Integrity: Incision (comment)(post op dressing L UE)  Skin Integumentary  Skin Color: Appropriate for ethnicity  Skin Condition/Temp: Warm;Dry  Skin Integrity: Incision (comment)(post op dressing L UE)  Turgor: Non-tenting  Hair Growth: Present  Varicosities: Absent  Strength:    Strength: Generally decreased, functional  Tone & Sensation:   Sensation: Intact  Range Of Motion:  AROM: Generally decreased, functional  Functional Mobility:  Bed Mobility:  Supine to Sit: Maximum assistance; Total assistance;Assist x2; Additional time;Bed Modified  Sit to Supine: Total assistance;Assist x2  Scooting: Total assistance;Assist x2  Transfers:  Sit to Stand: Maximum assistance; Additional time;Assist x2  Stand to Sit: Moderate assistance; Additional time;Assist x2  Balance:   Sitting: Impaired  Sitting - Static: Good (unsupported)  Sitting - Dynamic: Fair (occasional)  Standing: Impaired;Pull to stand  Standing - Static: Poor  Standing - Dynamic : Not tested  Ambulation/Gait Training:  Gait Description (WDL): (unable at this time)  Pain:  Pain Scale 1: Numeric (0 - 10)  Pain Intensity 1: 5  Pain Location 1: Arm;Elbow  Pain Orientation 1: Left  Pain Description 1: Throbbing  Pain Intervention(s) 1: Repositioned;Elevation  Activity Tolerance:   Fair   Please refer to the flowsheet for vital signs taken during this treatment. After treatment:   ?         Patient left in no apparent distress sitting up in chair  ? Patient left in no apparent distress in bed  ? Call bell left within reach  ? Nursing notified  ? Caregiver present  ? Bed alarm activated    COMMUNICATION/EDUCATION:   ?         Fall prevention education was provided and the patient/caregiver indicated understanding. ? Patient/family have participated as able in goal setting and plan of care. ?         Patient/family agree to work toward stated goals and plan of care. ?         Patient understands intent and goals of therapy, but is neutral about his/her participation. ? Patient is unable to participate in goal setting and plan of care.     Eval Complexity: History: MEDIUM  Complexity : 1-2 comorbidities / personal factors will impact the outcome/ POC Exam:MEDIUM Complexity : 3 Standardized tests and measures addressing body structure, function, activity limitation and / or participation in recreation  Presentation: MEDIUM Complexity : Evolving with changing characteristics  Clinical Decision Making:High Complexity    Overall Complexity:HIGH     Thank you for this referral.  Carley Barrow, PT   Time Calculation: 59 mins

## 2019-07-12 NOTE — PROGRESS NOTES
Transition of care to SNF: [facility]     Communication to Patient/Family:  Met with patient and family, and they are agreeable to the transition plan. Authorization called into [facility] for [facility] for [facility] and clinical information faxed to . SNF/Rehab Transition: Pending insurance authorization:  Patient has been accepted to [facility] at [address] [place], [place] [address]and meets criteria for admission. Patient will transported by [facility] and expected to leave once authorization obtained today (Friday) vs tomorrow (Saturday). Pt and family aware pt could incur cost for transport. Communication to SNF/Rehab:  Bedside RN has been notified to update the transition plan to the facility and call report [phone]    Discharge information has been updated on the AVS and sent via [facility] and/or CC link. Discharge instructions to be fax'd to facility at [phone]     Please include all hard scripts for controlled substances, med rec and dc summary, and AVS in packet. Please medicate for pain prior to dc if possible and needed to help offset delay when patient first arrives to facility. Reviewed and confirmed with facility, [facility] and can manage the patient care needs for the following:     Shantel Mcmanus with (X) only those applicable:  Medication:  [x]Medications are available at the facility  []IV Antibiotics    [x]Controlled Substance  hard copies available sent.   []Weekly Labs    Equipment:  []CPAP/BiPAP  []Wound Vacuum  []Jarrett or Urinary Device  []PICC/Central Line  []Nebulizer  []Ventilator    Treatment:  []Isolation (for MRSA, VRE, etc.)  []Surgical Drain Management  []Tracheostomy Care  [x]Dressing Changes  []Dialysis with transportation  []PEG Care  []Oxygen  []Daily Weights for Heart Failure    Dietary:  []Any diet limitations  []Tube Feedings   []Total Parenteral Management (TPN)    Financial Resources:  []Medicaid Application Completed    []UAI Completed and copy given to pt/family    []A screening has previously been completed. []Level II Completed    [] Private pay individual who will not become financially eligible for Medicaid within 6 months from admission to a [address] [place] [address] facility. [x] Individual refused to have screening conducted. []Medicaid Application Completed    []The screening denied because it was determined individual did not need/did not qualify for nursing facility level of care. [] Out of state residents seeking direct admission to a [address] facility. [] Individuals who are inpatients of an out of state hospital, or in state or out of state veterans/ hospital and seek direct admission to a [address] facility  [] Individuals who are pateints or residents of a state owned/operated facility that is licensed by Department of [employer] ([employer]) and seek direct admission to [address] [place] [address]  [] A screening not required for enrollment in [address] [address] [address] services as set out in [address] [place] [address] 43-  [] [facility]) staff shall perform screenings of the [facility] clients. Advanced Care Plan:  []Surrogate Decision Maker of Care  []POA  []Communicated Code Status and copy sent.     Other:

## 2019-07-12 NOTE — DISCHARGE SUMMARY
Discharge Summary   Admit Date: 7/11/2019  Discharge Date:  7/15/2019      Patient ID:  Monika Pak  80 y.o.  10/8/1931        Discharge Diagnosis:  Status post left total elbow replacement; Distal humerus fracture of left elbow      Current Discharge Medication List      START taking these medications    Details   oxyCODONE-acetaminophen (PERCOCET) 5-325 mg per tablet Take 1-2 Tabs by mouth every four (4) hours as needed for Pain for up to 7 days. Max Daily Amount: 12 Tabs. Qty: 42 Tab, Refills: 0    Associated Diagnoses: Closed bicondylar fracture of distal end of left humerus with routine healing, subsequent encounter         CONTINUE these medications which have NOT CHANGED    Details   Omega-3 Fatty Acids (FISH OIL) 500 mg cap Take  by mouth.      cyanocobalamin 1,000 mcg tablet Take 1,000 mcg by mouth daily. CHROMIUM PO Take 1 Tab by mouth. cholecalciferol, VITAMIN D3, (VITAMIN D3) 5,000 unit tab tablet Take 5,000 Units by mouth daily. NIACIN PO Take 1 Tab by mouth. OTHER,NON-FORMULARY, Take 1 Tab by mouth daily. Indications: Vitamin A      cinnamon bark (CINNAMON) 500 mg cap Take 1 Cap by mouth. insulin glargine (LANTUS SOLOSTAR U-100 INSULIN) 100 unit/mL (3 mL) inpn 20 Units by SubCUTAneous route daily. losartan (COZAAR) 25 mg tablet Take  by mouth nightly. finasteride (PROSCAR) 5 mg tablet Take 5 mg by mouth daily. atorvastatin (LIPITOR) 10 mg tablet Take 10 mg by mouth nightly.      magnesium oxide 400 mg magnesium tab Take  by mouth. COQ10, LIPOSOMAL UBIQUINOL, PO Take  by mouth. aspirin delayed-release 81 mg tablet Take 81 mg by mouth daily.          STOP taking these medications       HYDROcodone-acetaminophen (NORCO) 5-325 mg per tablet Comments:   Reason for Stopping:               Allergies   Allergen Reactions    Latex Unknown (comments)        Operative Procedures:  Left total elbow replacement    HPI:  Monika Pak is a 80 y.o. male   who has been followed at [facility] and [address] [place] [address] by Dr Mat Bartlett. Workup included radiographs which revealed he was an appropriate candidate for total elbow replacement. Risks, benefits, alternatives and outcomes of surgical management were discussed with the patient who stated that he understood and wished to proceed. Appropriate informed consent was obtained and arrangements were made for elective surgery. Hospital Course:  Pt was admitted to [facility] and taken to the OR where he underwent left total elbow replacement. Postoperative course was uneventful. Patient is doing well with pain control and progressing with physical therapy. Physical Exam on Discharge:   Splint clean and intact  NVI Bilateral Lower and Upper Extremities. Sensation to light touch intact  Moderate edema left hand  +TTP Left ankle  Painful ROM of Left hip  Calves soft and nontender. Visit Vitals  /48   Pulse (!) 122   Temp 98.5 °F (36.9 °C)   Resp 17   Ht 5' 7\" (1.702 m)   Wt 70.3 kg (155 lb)   SpO2 96%   BMI 24.28 kg/m²     Incision shows no drainage, erythema, or warmth. No calf pain. Moves ankles and toes well. NVS intact.     Most Recent BMP and CBC:    Recent Labs     07/12/19  0851   BUN 23*   *   CO2 29     Recent Labs     07/12/19  0851   WBC 5.0   RBC 2.55*   HCT 24.3*   MCV 95.3   MCH 32.2   MCHC 33.7   RDW 12.4     No results found for: INR      Condition at discharge: Stable    Disposition: Skilled nursing facility        PCP:  Andrew Lerma MD

## 2019-07-12 NOTE — PROGRESS NOTES
Problem: Mobility Impaired (Adult and Pediatric)  Goal: *Acute Goals and Plan of Care (Insert Text)  Description  Physical Therapy Goals  Initiated 7/12/2019  to be met within 7 days    1. Bed mobility:  Supine to/from sit with mod assist in prep for ADL activity. 2. Transfers:  Sit to/from stand with min/mod assist/fiorella-walker in prep for gait. 3. Tolerate sitting up in chair >30min for functional activity performance. 4. Ambulation:  Ambulate 50 ft. with min assist/fiorella-walker for increased functional mobility. 5. Patient Education:  Performance of therapeutic exercises for strengthening LE's with supervision for ability to achieve above goals. Outcome: Not Progressing Towards Goal     PHYSICAL THERAPY TREATMENT    Patient: Allyssa Byrd (89 y.o. male)  Date: 7/12/2019  Diagnosis: Humerus fracture [S42.309A]  Closed bicondylar fracture of distal end of left humerus [S42.492A]  Closed bicondylar fracture of distal end of left humerus [S42.492A] <principal problem not specified>  Procedure(s) (LRB):  LEFT TOTAL ELBOW REPLACEMENT WITH C-ARM,  \"SPEC POP\" (Left) 1 Day Post-Op  Precautions: Fall, NWB(LUE)   Chart, physical therapy assessment, plan of care and goals were reviewed. ASSESSMENT:  New Temp of 99.6. RN informed. Pt is agreeable to exercise and transition to EOB. Max total assist required for all movement. C/o of L hip pain, once EOB. 3 Standing attempts with Max assistance (Posterior lean). Unable to shift wt to L or step, due to increased pain. Placement strongly recommended, due to high assistance requirement. Progression toward goals:  ?      Improving appropriately and progressing toward goals  ? Improving slowly and progressing toward goals  ? Not making progress toward goals and plan of care will be adjusted     PLAN:  Patient continues to benefit from skilled intervention to address the above impairments. Continue treatment per established plan of care.   Discharge Recommendations:  Skilled Nursing Facility  Further Equipment Recommendations for Discharge:  bedside commode and rolling walker     SUBJECTIVE:   Patient stated ? I will try.?    OBJECTIVE DATA SUMMARY:   Critical Behavior:  Neurologic State: Alert  Orientation Level: Oriented X4  Cognition: Appropriate for age attention/concentration, Follows commands  Safety/Judgement: Fall prevention  Functional Mobility Training:  Bed Mobility:  Rolling: Maximum assistance; Total assistance  Supine to Sit: Maximum assistance; Total assistance  Sit to Supine: Maximum assistance; Total assistance  Scooting: Maximum assistance; Total assistance  Transfers:  Sit to Stand: Maximum assistance; Total assistance  Stand to Sit: Maximum assistance; Total assistance  Balance:  Sitting: Impaired  Sitting - Static: Good (unsupported)  Sitting - Dynamic: Fair (occasional)  Standing: Impaired; With support  Standing - Static: Poor  Standing - Dynamic : Not tested  Ambulation/Gait Training:  Gait Description (WDL): (unable at this time)  Therapeutic Exercises:       EXERCISE   Sets   Reps   Active Active Assist   Passive Self ROM   Comments   Ankle Pumps 1 20  ? ? ? ?    Quad Sets/Glut Sets 1 10 ? ? ? ? Hamstring Sets 1 10 ? ? ? ? Short Arc Quads 1 10 ? ? ? ? Heel Slides 1 10 ? ? ? ? Straight Leg Raises   ? ? ? ? Hip Abd/Add 1 10 ? ? ? ? Long Arc Quads 1 10 ? ? ? ? Seated Marching 2 5 ? ? ? ? L hip pain   Standing Marching   ? ? ? ? Hook lying bridge 1 5 ? ? ? ? Pain:  Pain Scale 1: Numeric (0 - 10)  Pain Intensity 1: 5  Pain Location 1: Arm;Elbow  Pain Orientation 1: Left  Pain Description 1: Throbbing  Pain Intervention(s) 1: Repositioned;Elevation  Activity Tolerance:   Fair  Please refer to the flowsheet for vital signs taken during this treatment. After treatment:   ? Patient left in no apparent distress sitting up in chair  ? Patient left in no apparent distress in bed  ? Call bell left within reach  ?  Nursing notified  ? Caregiver present  ?  Bed alarm activated      Melvina Plane, PTA   Time Calculation: 45 mins

## 2019-07-12 NOTE — PROGRESS NOTES
1434 - Reported by nurse that she could not palpate radial pulse. Able to locate strong pulse via doppler. Arm elevated and ice applied.

## 2019-07-12 NOTE — PROGRESS NOTES
Occupational Therapy Goals  Initiated 7/12/2019 within 7 day(s). 1. Patient will perform self-feeding with minimal assistance/contact guard assist seated in chair. 2. Patient will perform grooming with minimal assistance/contact guard assist.  3. Patient will perform upper body dressing with moderate assistance and adaptive dressing techniques. 4. Patient will perform toilet transfers with moderate assistance . 5. Patient will perform all aspects of toileting with moderate assistance . 6. Patient will participate in upper extremity therapeutic exercise/activities with moderate assistance for 10 minutes. 7. Patient will utilize energy conservation techniques during functional activities with verbal, visual and tactile cues. 8. Patient will perform lower body dressing with moderate assistance. OCCUPATIONAL THERAPY EVALUATION    Patient: Katie Sena (19 y.o. male)  Date: 7/12/2019  Primary Diagnosis: Humerus fracture [S42.309A]  Closed bicondylar fracture of distal end of left humerus [S42.492A]  Procedure(s) (LRB):  LEFT TOTAL ELBOW REPLACEMENT WITH C-ARM,  \"SPEC POP\" (Left) 1 Day Post-Op   Precautions:  Fall, NWB(LUE)  PLOF: independent for transfers and ADLs    ASSESSMENT :  Based on the objective data described below, the patient presents with decreased, strength, endurance and balance following distal end left humerus fracture and left total elbow replacement. Pt presented seated at EOB at the beginning of session. Pt co-treated with PT for safety and fall prevention. Pt participated with STS transfers and tolerated standing at EOB for ~3 minutes before returning to bed. Pt presents with significant edema at L hand and encouraged to perform active movements at IP joints and wrist at L. Pt verbalized understanding. Pt was returned to bed in supine with LUE elevated and wrist supported in neutral position. Pain at 5/10 throughout the session.      Patient will benefit from skilled intervention to address the above impairments. Patient's rehabilitation potential is considered to be Good  Factors which may influence rehabilitation potential include:   ? None noted  ? Mental ability/status  ? Medical condition  ? Home/family situation and support systems  ? Safety awareness  ? Pain tolerance/management  ? Other:      PLAN :  Recommendations and Planned Interventions:   ?               Self Care Training                  ? Therapeutic Activities  ? Functional Mobility Training   ? Cognitive Retraining  ? Therapeutic Exercises           ? Endurance Activities  ? Balance Training                    ? Neuromuscular Re-Education  ? Visual/Perceptual Training     ? Home Safety Training  ? Patient Education                   ? Family Training/Education  ? Other (comment):    Frequency/Duration: Patient will be followed by occupational therapy 1-2 times per day/4-7 days per week to address goals. Discharge Recommendations: Dimitri Frost  Further Equipment Recommendations for Discharge: TBD      SUBJECTIVE:   Patient stated ? I will try to move the fingers. ?    OBJECTIVE DATA SUMMARY:     Past Medical History:   Diagnosis Date    Diabetes ([facility] [address] 2001    Hypertension 2003     Past Surgical History:   Procedure Laterality Date    HX HERNIA REPAIR Bilateral     HX TONSILLECTOMY       Barriers to Learning/Limitations: None  Compensate with: visual, verbal, tactile, kinesthetic cues/model    Home Situation:   Home Situation  Home Environment: Private residence  # Steps to Enter: 0  One/Two Story Residence: One story  Living Alone: No  Support Systems: Child(сергей), Family member(s)  Patient Expects to be Discharged to[de-identified] Rehabilitation facility  Current DME Used/Available at Home: Walker, rolling  Tub or Shower Type: Tub/Shower combination  ? Right hand dominant   ? Left hand dominant    Cognitive/Behavioral Status:  Neurologic State: Alert  Orientation Level: Oriented X4  Cognition: Appropriate for age attention/concentration; Follows commands  Safety/Judgement: Fall prevention    Skin: intact  Edema: moderate at L hand    Vision/Perceptual:    Tracking: Able to track stimulus in all quadrants w/o difficulty    Coordination: BUE  Coordination: Generally decreased, functional  Fine Motor Skills-Upper: Left Impaired;Right Intact    Gross Motor Skills-Upper: Left Impaired;Right Intact  Balance:  Sitting: Impaired  Sitting - Static: Good (unsupported)  Sitting - Dynamic: Fair (occasional)  Standing: Impaired;Pull to stand  Standing - Static: Poor  Standing - Dynamic : Not tested  Strength: BUE  Strength: Generally decreased, functional  Tone & Sensation: BUE  Sensation: Intact  Range of Motion: BUE  AROM: Generally decreased, functional  Functional Mobility and Transfers for ADLs:  Bed Mobility:  Sit to Supine: Total assistance;Assist x2  Scooting: Total assistance;Assist x2  Transfers:  Sit to Stand: Maximum assistance; Additional time;Assist x2  Stand to Sit: Moderate assistance; Additional time;Assist x2  ADL Assessment:   Feeding: Moderate assistance    Oral Facial Hygiene/Grooming: Moderate assistance    Upper Body Dressing: Total assistance    Lower Body Dressing: Total assistance    Toileting: Total assistance  ADL Intervention:  Feeding  Feeding Assistance: Moderate assistance  Drink to Mouth: Moderate assistance    Cognitive Retraining  Safety/Judgement: Fall prevention    Therapeutic Exercise:  AROM at L wrist and IP joints, x5. Minimum rest breaks required. Pain:  Pain level pre-treatment: 5/10   Pain level post-treatment: 5/10   Pain Intervention(s): Medication (see MAR);  Rest, Ice, Repositioning   Response to intervention: Nurse notified, See doc flow    Activity Tolerance:   Fair     Please refer to the flowsheet for vital signs taken during this treatment. After treatment:   ? Patient left in no apparent distress sitting up in chair  ? Patient left in no apparent distress in bed  ? Call bell left within reach  ? Nursing notified  ? Caregiver present  ? Bed alarm activated    COMMUNICATION/EDUCATION:   ? Role of Occupational Therapy in the acute care setting  ? Home safety education was provided and the patient/caregiver indicated understanding. ? Patient/family have participated as able in goal setting and plan of care. ? Patient/family agree to work toward stated goals and plan of care. ? Patient understands intent and goals of therapy, but is neutral about his/her participation. ? Patient is unable to participate in goal setting and plan of care. Thank you for this referral.  Lethaniel Overcast, OTR/L  Time Calculation: 29 mins    Eval Complexity: History: HIGH Complexity : Extensive review of history including physical, cognitive and psychosocial history ; Examination: HIGH Complexity : 5 or more performance deficits relating to physical, cognitive , or psychosocial skils that result in activity limitations and / or participation restrictions; Decision Making:HIGH Complexity : Patient presents with comorbidities that affect occupational performance.  Signifigant modification of tasks or assistance (eg, physical or verbal) with assessment (s) is necessary to enable patient to complete evaluation

## 2019-07-12 NOTE — PHYSICIAN ADVISORY
Letter of admission status determination Cony Germain Age: 80 y.o. MRN: [id number] Admitting physician:   
Insurance: Payor: Essie Agent / Plan: [address] [place] [address] HMO / Product Type: Managed Care Medicare /  
 
Cony Germain was hospitalized for an elective surgical procedure authorized as outpatient and has had an uneventful postoperative course. Therefore, our recommendation is to place Cony Germain in Outpatient status. The final decision regarding the patient's hospitalization status depends on the attending physician's judgment. Eli Tobin MD, STEVEN, [facility], CHCQM-PHYADV Physician Advisor [address] [place] [address],[address] [phone]

## 2019-07-12 NOTE — PROCEDURES
PROCEDURE NOTE:    Written consent was obtained. Procedure discussed with patient and all questions were answered. Also discussed with family members in room. Patient was sterilely prepped with betadine swabs x3 to the left hip. Landmarks palpated and injection site marked. A 21 gauge needle was used to inject 1cc of Depo-medrol 40mg and 2cc 1% lidocaine into the left hip without complication. Area was cleaned with alcohol prep pads and ban-aid applied. Patient tolerated procedure well.      LEONEL Mccrary   11:39 AM

## 2019-07-12 NOTE — OP NOTES
[facility]  OPERATIVE REPORT    Name:  Diego Johnson  MR#:   [id number]  :  10/08/1931  ACCOUNT #:  [de-identified]  DATE OF SERVICE:  2019    PREOPERATIVE DIAGNOSIS:  Left distal humerus intra-articular comminuted fracture. POSTOPERATIVE DIAGNOSIS:  Left distal humerus intra-articular comminuted fracture. PROCEDURE PERFORMED:  Left total elbow replacement. PRIMARY SURGEON:  Nessa Blevins MD    ASSISTANT:  Diana Watson. YANELI Colmenares, assisted with patient positioning, nerve retraction, retraction, closure, and hemostasis, and implantation of devices. ANESTHESIA:  General.    COMPLICATIONS:  None. SPECIMENS REMOVED:  None. IMPLANTS:  Miranda Coonrad/Morrey total elbow replacement, extra small humerus, extra small ulna. TOURNIQUET TIME:  Approximately 90 minutes at 270 mmHg. ESTIMATED BLOOD LOSS:  50ml    INDICATIONS:  The patient is an 80-year-old gentleman with comminuted left humeral fracture, opted for fixation versus replacement after risks and benefits were explained to him. PROCEDURE:  The patient was taken to the OR, given general anesthetic, preoperative antibiotics, positioned, prepped and draped in the usual sterile fashion with the left upper extremity draped free. Surgical time-out was performed, confirming the left side as the proper site, after all team members agreed. Tourniquet was placed with a sterile tourniquet. Posterior incision was made, skin was incised. Dissection was carried down to the olecranon and the posterior triceps, at which point, we did a triceps slide with a full-thickness triceps  tendon, sliding over from all the way radially. We also dissected out the ulnar nerve, doing a ulnar nerve decompression. Once we had a good exposure of the distal humerus, it noted to be highly comminuted. The bone was quite osteoporotic and very soft.     At this point, we felt that replacement would be better than an ORIF given the comminution and poor bone quality. At which point, we then prepared the humerus and ulna with the rasp up to extra small, would not accept anything larger. It was trialed with the extra small. We gain good position and orientation and alignment with good full extension and flexion of the components, with no bony impingement. Once satisfied with the trialing, trial was removed. Wounds were irrigated. We then cemented in sequence, humerus and ulna after packing the canal with the bone cement. Once the cement was packed nicely, the components were then slid in in a pressurized-type fashion and the excess bone was removed. We then held the components in the bone until the cement hardened. Once the cement was fully hardened, we then trialed again with good position of the implants achieving full flexion, full extension, and good position. We then placed implant CAM into the implant, locking it in place. Once we were satisfied with this and the patient had good full range of motion, we then re-secured the triceps slide, first with #2 FiberWires since it had been drilled through the ulna prior to cementing and these were used to secure the triceps back down to the olecranon. We also then went ahead and reinforced this on the fascial sides with #1 Vicryl. The wound was then irrigated once again, and the skin was closed with 2-0 Monocryl deep dermal with staples to skin, dry sterile dressing, tourniquet was let down. The patient was placed in an extension splint to protect the soft tissue repair, and the patient was then extubated, taken to recovery room in stable condition. Postoperative plan is ice and elevation, immobilization, pain control.       MD URBANO [email or web address]  D:  07/11/2019 17:56  T:  07/12/2019 0:05  JOB #:  [id number]

## 2019-07-12 NOTE — PROGRESS NOTES
Reason for Admission:   Left elbow replacement, distal humerus fx                   RRAT Score:      8               Plan for utilizing home health:     n/a                     Current Advanced Directive/Advance Care Plan:                           Transition of Care Plan:    Chart reviewed, met with pt and son in law, noted consult for SNF. Confirmed with MD office that MD states pt ready today. Left list of SNFs with pt and son in law, will follow up with pt once daughter returns to room- she is in hospital has stepped away. Pt will need accepting facility and insurance approval to transition to next level of care. 65- spoke with pt and family, pt deferred to daughter. Reviewed, GOOD given to pt, reviewed transition planning process. FOC offered, pt family chose [facility] or [facility] for follow up; referrals placed with CMS. 4201 Sandra Virgen with Digna at [facility] [phone] to inform of referral, she will call back if able to assist and start insurance authorization, aware pt ready today if able to get auth. Family aware and agrees with plan. Care Management Interventions  PCP Verified by CM:  Yes  Discharge Durable Medical Equipment: No  Physical Therapy Consult: Yes  Occupational Therapy Consult: Yes  Current Support Network: Lives Alone, Relative's Home  Confirm Follow Up Transport: Family  Plan discussed with Pt/Family/Caregiver: Yes  Freedom of Choice Offered: Yes  Discharge Location  Discharge Placement: Skilled nursing facility

## 2019-07-12 NOTE — PROGRESS NOTES
PA called to say she would do bedside procedure, was informed of Pt vitals and Mews score of 3. Will give pain meds. 0845-Pain 8/10. PRN percocet 5-325 1 tab pain medication administered at this time. Patient has been educated on side effects. Pt reportedly has intense drowsiness and confusion with narcotics, will reassess. 0915-Carmen CASTANEDA orders cozaar 25 now. 1228-Called MD to verify Humalog order, says 1-6 units however according to scale he should be given 8 units.

## 2019-07-12 NOTE — PROGRESS NOTES
[address] Medicine    Orthopaedics  POD #1  Patient without new complaints status post Left ELBOW ARTHROPLASTY for Humerus fracture [S42.309A] 7/11/2019. No new changes. He has good sensation in left hand, feels tight from swelling. He does have some moderate pain. Denies chest pain, calf pain, or SOB. Also C/O Left hip and left ankle pain. Will inject left hip today, and monitor left ankle. Visit Vitals  /66   Pulse (!) 111   Temp 99.1 °F (37.3 °C)   Resp 16   Ht 5' 7\" (1.702 m)   Wt 70.3 kg (155 lb)   SpO2 97%   BMI 24.28 kg/m²        Hemogram  No results found for: WBC, RBC, HCT, HCTEXT   Basic Metabolic Profile  No results found for: NA, POTASSIUM, CHLORIDE, CO2, BUN    PT/INR  No results found for: INR    Physical exam:   Splint clean and intact  NVI Bilateral Lower and Upper Extremities. Sensation to light touch intact  Moderate edema left hand  +TTP Left ankle  Painful ROM of Left hip  Calves soft and nontender. Assessment:  Status post Left ELBOW ARTHROPLASTY for Humerus fracture [S42.309A] 7/11/2019,  progressing.     PLAN:    Mobilize with P.T. - No use of left arm  Pain management  Discharge planning: D/C today to SNF after PT/OT if bed placement available

## 2019-07-12 NOTE — DIABETES MGMT
Glycemic Control     Pt w/ hx of Type 2 Diabetes for last 40 years. Hgb A1C 7.0% within target range for age and co-morbidites. Confirmed PTA insulin regimen w/ patient includes Lantus 20 units daily. Pt hyperglycemic today w/ BG POC of  289, 301 mg/dL. Pt received solumedrol 40 this morning. Diet:Diabetic Consistent Carb     Current Insulin regimen  Humalog corrective normal insulin sensitivity     Recommend : Lantus 15 units now. Addendum: orders for Lantus 10 units and Humalog corrective normal insulin sensitivity scale entered w/ verbal permission from [address] Will continue to monitor and follow-up per policy.      Cristobal Berg, [address] [place] [address]  Pager [phone]

## 2019-07-12 NOTE — PROGRESS NOTES
1425-Calling MD office about excessive swelling of hand. From operative side. 1435-Radial pulse present with doppler.  949 [facility] says elevate above heart and ice

## 2019-07-12 NOTE — PROGRESS NOTES
2002 TRANSFER - IN REPORT:    Verbal report received from Lilian Oreilly RN (name) on Red Fiddler  being received from PACU (unit) for routine progression of care      Report consisted of patients Situation, Background, Assessment and   Recommendations(SBAR). Information from the following report(s) SBAR, OR Summary, Procedure Summary, Intake/Output, MAR, Recent Results and Med Rec Status was reviewed with the receiving nurse. Opportunity for questions and clarification was provided. Assessment completed upon patients arrival to unit and care assumed. 20 G(size) IV right forearm (location) present. 0710 - Bedside and Verbal shift change report given to Dante De Paz RN (oncoming nurse) by Fredrick Olivier RN (offgoing nurse) successfully. Report included the following information SBAR, Kardex, Procedure Summary, Intake/Output, MAR, Accordion, Recent Results and Med Rec Status.

## 2019-07-12 NOTE — PERIOP NOTES
Bedside handoff to Luis Felipe Coyne RN. Pt stable, no s/s of distress. Opportunity for questions provided. Dual skin assessment completed at this time.

## 2019-07-13 LAB
GLUCOSE BLD STRIP.AUTO-MCNC: 250 MG/DL (ref 70–110)
GLUCOSE BLD STRIP.AUTO-MCNC: 288 MG/DL (ref 70–110)
GLUCOSE BLD STRIP.AUTO-MCNC: 331 MG/DL (ref 70–110)
GLUCOSE BLD STRIP.AUTO-MCNC: 369 MG/DL (ref 70–110)

## 2019-07-13 PROCEDURE — 97110 THERAPEUTIC EXERCISES: CPT

## 2019-07-13 PROCEDURE — 77030027138 HC INCENT SPIROMETER -A

## 2019-07-13 PROCEDURE — 74011250637 HC RX REV CODE- 250/637: Performed by: PHYSICIAN ASSISTANT

## 2019-07-13 PROCEDURE — 74011636637 HC RX REV CODE- 636/637: Performed by: ORTHOPAEDIC SURGERY

## 2019-07-13 PROCEDURE — 97530 THERAPEUTIC ACTIVITIES: CPT

## 2019-07-13 PROCEDURE — 74011636637 HC RX REV CODE- 636/637: Performed by: PHYSICIAN ASSISTANT

## 2019-07-13 PROCEDURE — 82962 GLUCOSE BLOOD TEST: CPT

## 2019-07-13 PROCEDURE — 65270000029 HC RM PRIVATE

## 2019-07-13 PROCEDURE — 77030032490 HC SLV COMPR SCD KNE COVD -B

## 2019-07-13 RX ORDER — ACETAMINOPHEN 325 MG/1
650 TABLET ORAL
Status: DISCONTINUED | OUTPATIENT
Start: 2019-07-13 | End: 2019-07-14

## 2019-07-13 RX ORDER — ONDANSETRON 4 MG/1
8 TABLET, ORALLY DISINTEGRATING ORAL
Status: DISCONTINUED | OUTPATIENT
Start: 2019-07-13 | End: 2019-07-15 | Stop reason: HOSPADM

## 2019-07-13 RX ORDER — INSULIN LISPRO 100 [IU]/ML
INJECTION, SOLUTION INTRAVENOUS; SUBCUTANEOUS
Status: DISCONTINUED | OUTPATIENT
Start: 2019-07-13 | End: 2019-07-15 | Stop reason: HOSPADM

## 2019-07-13 RX ADMIN — INSULIN GLARGINE 10 UNITS: 100 INJECTION, SOLUTION SUBCUTANEOUS at 15:45

## 2019-07-13 RX ADMIN — OXYCODONE HYDROCHLORIDE AND ACETAMINOPHEN 1 TABLET: 5; 325 TABLET ORAL at 08:48

## 2019-07-13 RX ADMIN — INSULIN LISPRO 9 UNITS: 100 INJECTION, SOLUTION INTRAVENOUS; SUBCUTANEOUS at 19:26

## 2019-07-13 RX ADMIN — Medication 10 ML: at 21:58

## 2019-07-13 RX ADMIN — POLYETHYLENE GLYCOL 3350 17 G: 17 POWDER, FOR SOLUTION ORAL at 08:47

## 2019-07-13 RX ADMIN — LOSARTAN POTASSIUM 25 MG: 25 TABLET ORAL at 21:58

## 2019-07-13 RX ADMIN — Medication 10 ML: at 06:00

## 2019-07-13 RX ADMIN — ACETAMINOPHEN 650 MG: 325 TABLET ORAL at 15:45

## 2019-07-13 RX ADMIN — FINASTERIDE 5 MG: 5 TABLET, FILM COATED ORAL at 08:47

## 2019-07-13 RX ADMIN — ATORVASTATIN CALCIUM 10 MG: 10 TABLET, FILM COATED ORAL at 21:58

## 2019-07-13 RX ADMIN — ASPIRIN 81 MG: 81 TABLET ORAL at 08:48

## 2019-07-13 RX ADMIN — INSULIN LISPRO 15 UNITS: 100 INJECTION, SOLUTION INTRAVENOUS; SUBCUTANEOUS at 21:59

## 2019-07-13 RX ADMIN — Medication 10 ML: at 15:46

## 2019-07-13 RX ADMIN — OXYCODONE HYDROCHLORIDE AND ACETAMINOPHEN 1 TABLET: 5; 325 TABLET ORAL at 19:26

## 2019-07-13 RX ADMIN — INSULIN LISPRO 8 UNITS: 100 INJECTION, SOLUTION INTRAVENOUS; SUBCUTANEOUS at 08:48

## 2019-07-13 RX ADMIN — INSULIN LISPRO 9 UNITS: 100 INJECTION, SOLUTION INTRAVENOUS; SUBCUTANEOUS at 12:46

## 2019-07-13 NOTE — PROGRESS NOTES
Progress Note     Patient: Valentine Bee MRN: [id number]  SSN: [id number]    YOB: 1931  Age: 80 y.o. Sex: male      POD:    2 Days Post-Op  S/P:    Procedure(s):  LEFT TOTAL ELBOW REPLACEMENT WITH C-ARM,  \"SPEC POP\"     Subjective:   Pt is with complaints of pain and nausea this morning     Objective:     Patient Vitals for the past 12 hrs:   BP Temp Pulse Resp SpO2   07/13/19 0816 111/48 98.5 °F (36.9 °C) (!) 122 17 96 %   07/13/19 0330 155/63 97.8 °F (36.6 °C) 93 18 99 %   07/12/19 2312 127/74 98.4 °F (36.9 °C) 99 20 97 %     Recent Labs     07/12/19  0851   HGB 8.2*   HCT 24.3*   *   K 4.3      CO2 29   BUN 23*   CREA 0.92   *       Pt. resting in bed. Upper extremity operative dressing clean/dry/intact; patient in orthoglass extension splint and ACE wrap; hand with significant swelling--neurovascularly intact. Assessment:     Awake and alert. In good spirits. Plan:   1. Continue pain management/ ice to operative area. 2. Continue to progress with PT/OT. 3. Patient is to remain in extension splint with no use left arm. 4. Continue ice and elevation of the LUE to help reduce swelling. 5. Discharge planning to SNF when approved by insurance (as per CM notes possibly today).

## 2019-07-13 NOTE — PROGRESS NOTES
Problem: Falls - Risk of  Goal: *Absence of Falls  Description  Document Esaw Morton Fall Risk and appropriate interventions in the flowsheet. Outcome: Progressing Towards Goal     Problem: Pressure Injury - Risk of  Goal: *Prevention of pressure injury  Description  Document Ethan Scale and appropriate interventions in the flowsheet. Outcome: Progressing Towards Goal     Problem: Diabetes Self-Management  Goal: *Disease process and treatment process  Description  Define diabetes and identify own type of diabetes; list 3 options for treating diabetes. Outcome: Progressing Towards Goal  Goal: *Monitoring blood glucose, interpreting and using results  Description  Identify recommended blood glucose targets  and personal targets.   Outcome: Progressing Towards Goal     Problem: Pain  Goal: *Control of Pain  Outcome: Progressing Towards Goal

## 2019-07-13 NOTE — PROGRESS NOTES
Problem: Mobility Impaired (Adult and Pediatric)  Goal: *Acute Goals and Plan of Care (Insert Text)  Description  Physical Therapy Goals  Initiated 7/12/2019  to be met within 7 days    1. Bed mobility:  Supine to/from sit with mod assist in prep for ADL activity. 2. Transfers:  Sit to/from stand with min/mod assist/fiorella-walker in prep for gait. 3. Tolerate sitting up in chair >30min for functional activity performance. 4. Ambulation:  Ambulate 50 ft. with min assist/fiorella-walker for increased functional mobility. 5. Patient Education:  Performance of therapeutic exercises for strengthening LE's with supervision for ability to achieve above goals. 7/13/2019 1149 by Michael Maciel PTA  Outcome: Progressing Towards Goal   PHYSICAL THERAPY TREATMENT    Patient: Reny Busby (1 y.o. male)  Date: 7/13/2019  Diagnosis: Humerus fracture [S42.309A]  Closed bicondylar fracture of distal end of left humerus [S42.492A]  Closed bicondylar fracture of distal end of left humerus [S42.492A] <principal problem not specified>  Procedure(s) (LRB):  LEFT TOTAL ELBOW REPLACEMENT WITH C-ARM,  \"SPEC POP\" (Left) 2 Days Post-Op  Precautions: Fall, NWB(LUE)   Chart, physical therapy assessment, plan of care and goals were reviewed. ASSESSMENT:  Pt is able to tolerate 40 min  of chair sitting. Pt assisted to [facility], where he spent 15min attempting BM. Pt requires max total assist back to bed. Family present at bedside. Progression toward goals:  ?      Improving appropriately and progressing toward goals  ? Improving slowly and progressing toward goals  ? Not making progress toward goals and plan of care will be adjusted     PLAN:  Patient continues to benefit from skilled intervention to address the above impairments. Continue treatment per established plan of care.   Discharge Recommendations:  Dimitri Frost  Further Equipment Recommendations for Discharge:  gait belt and N/A     SUBJECTIVE: Patient stated ? I would like to sit on that portable toilet. ?    OBJECTIVE DATA SUMMARY:   Critical Behavior:  Neurologic State: Alert  Orientation Level: Oriented X4  Cognition: Appropriate for age attention/concentration, Appropriate decision making  Safety/Judgement: Fall prevention  Functional Mobility Training:  Bed Mobility:  Rolling: Maximum assistance; Total assistance  Supine to Sit: Maximum assistance; Total assistance  Sit to Supine: Maximum assistance; Total assistance  Scooting: Maximum assistance; Total assistance  Transfers:  Sit to Stand: Maximum assistance; Total assistance  Stand to Sit: Maximum assistance; Total assistance  Bed to Chair: Maximum assistance; Total assistance  Balance:  Sitting: Impaired  Sitting - Static: Good (unsupported)  Sitting - Dynamic: Fair (occasional)  Standing: Impaired; With support  Standing - Static: Poor  Standing - Dynamic : Fair  Pain:  Pain Scale 1: Numeric (0 - 10)  Pain Intensity 1: 6  Pain out:   Activity Tolerance:   Fair-  Please refer to the flowsheet for vital signs taken during this treatment. After treatment:   ? Patient left in no apparent distress sitting up in chair  ? Patient left in no apparent distress in bed  ? Call bell left within reach  ? Nursing notified  ? Caregiver present  ?  Bed alarm activated      Karyna Crooks PTA   Time Calculation: 42 mins

## 2019-07-14 LAB
GLUCOSE BLD STRIP.AUTO-MCNC: 246 MG/DL (ref 70–110)
GLUCOSE BLD STRIP.AUTO-MCNC: 285 MG/DL (ref 70–110)
GLUCOSE BLD STRIP.AUTO-MCNC: 285 MG/DL (ref 70–110)
GLUCOSE BLD STRIP.AUTO-MCNC: 333 MG/DL (ref 70–110)

## 2019-07-14 PROCEDURE — 74011250637 HC RX REV CODE- 250/637: Performed by: ORTHOPAEDIC SURGERY

## 2019-07-14 PROCEDURE — 65270000029 HC RM PRIVATE

## 2019-07-14 PROCEDURE — 82962 GLUCOSE BLOOD TEST: CPT

## 2019-07-14 PROCEDURE — 97116 GAIT TRAINING THERAPY: CPT

## 2019-07-14 PROCEDURE — 74011636637 HC RX REV CODE- 636/637: Performed by: PHYSICIAN ASSISTANT

## 2019-07-14 PROCEDURE — 74011636637 HC RX REV CODE- 636/637: Performed by: ORTHOPAEDIC SURGERY

## 2019-07-14 PROCEDURE — 74011250637 HC RX REV CODE- 250/637: Performed by: PHYSICIAN ASSISTANT

## 2019-07-14 PROCEDURE — 97530 THERAPEUTIC ACTIVITIES: CPT

## 2019-07-14 PROCEDURE — 77030027138 HC INCENT SPIROMETER -A

## 2019-07-14 RX ORDER — DOCUSATE SODIUM 100 MG/1
100 CAPSULE, LIQUID FILLED ORAL 2 TIMES DAILY
Status: DISCONTINUED | OUTPATIENT
Start: 2019-07-14 | End: 2019-07-15 | Stop reason: HOSPADM

## 2019-07-14 RX ORDER — HYDROMORPHONE HYDROCHLORIDE 1 MG/ML
.2-.5 INJECTION, SOLUTION INTRAMUSCULAR; INTRAVENOUS; SUBCUTANEOUS
Status: DISCONTINUED | OUTPATIENT
Start: 2019-07-14 | End: 2019-07-15

## 2019-07-14 RX ORDER — FINASTERIDE 5 MG/1
5 TABLET, FILM COATED ORAL DAILY
Status: DISCONTINUED | OUTPATIENT
Start: 2019-07-15 | End: 2019-07-15 | Stop reason: HOSPADM

## 2019-07-14 RX ORDER — OXYCODONE AND ACETAMINOPHEN 5; 325 MG/1; MG/1
1-2 TABLET ORAL
Status: DISCONTINUED | OUTPATIENT
Start: 2019-07-14 | End: 2019-07-15

## 2019-07-14 RX ORDER — INSULIN GLARGINE 100 [IU]/ML
10 INJECTION, SOLUTION SUBCUTANEOUS EVERY 24 HOURS
Status: DISCONTINUED | OUTPATIENT
Start: 2019-07-14 | End: 2019-07-15

## 2019-07-14 RX ORDER — DIPHENHYDRAMINE HCL 25 MG
25 CAPSULE ORAL
Status: DISCONTINUED | OUTPATIENT
Start: 2019-07-14 | End: 2019-07-15 | Stop reason: HOSPADM

## 2019-07-14 RX ORDER — ATORVASTATIN CALCIUM 10 MG/1
10 TABLET, FILM COATED ORAL
Status: DISCONTINUED | OUTPATIENT
Start: 2019-07-14 | End: 2019-07-15 | Stop reason: HOSPADM

## 2019-07-14 RX ORDER — ACETAMINOPHEN 325 MG/1
650 TABLET ORAL
Status: DISCONTINUED | OUTPATIENT
Start: 2019-07-14 | End: 2019-07-15 | Stop reason: HOSPADM

## 2019-07-14 RX ORDER — LOSARTAN POTASSIUM 25 MG/1
25 TABLET ORAL
Status: DISCONTINUED | OUTPATIENT
Start: 2019-07-14 | End: 2019-07-15 | Stop reason: HOSPADM

## 2019-07-14 RX ORDER — FACIAL-BODY WIPES
10 EACH TOPICAL DAILY PRN
Status: DISCONTINUED | OUTPATIENT
Start: 2019-07-14 | End: 2019-07-15 | Stop reason: HOSPADM

## 2019-07-14 RX ORDER — ASPIRIN 81 MG/1
81 TABLET ORAL DAILY
Status: DISCONTINUED | OUTPATIENT
Start: 2019-07-15 | End: 2019-07-15 | Stop reason: HOSPADM

## 2019-07-14 RX ADMIN — ATORVASTATIN CALCIUM 10 MG: 10 TABLET, FILM COATED ORAL at 21:58

## 2019-07-14 RX ADMIN — DOCUSATE SODIUM 100 MG: 100 CAPSULE, LIQUID FILLED ORAL at 09:48

## 2019-07-14 RX ADMIN — OXYCODONE HYDROCHLORIDE AND ACETAMINOPHEN 1 TABLET: 5; 325 TABLET ORAL at 22:09

## 2019-07-14 RX ADMIN — ASPIRIN 81 MG: 81 TABLET ORAL at 09:48

## 2019-07-14 RX ADMIN — BISACODYL 10 MG: 10 SUPPOSITORY RECTAL at 10:35

## 2019-07-14 RX ADMIN — FINASTERIDE 5 MG: 5 TABLET, FILM COATED ORAL at 09:47

## 2019-07-14 RX ADMIN — INSULIN LISPRO 12 UNITS: 100 INJECTION, SOLUTION INTRAVENOUS; SUBCUTANEOUS at 21:58

## 2019-07-14 RX ADMIN — INSULIN LISPRO 9 UNITS: 100 INJECTION, SOLUTION INTRAVENOUS; SUBCUTANEOUS at 16:30

## 2019-07-14 RX ADMIN — INSULIN GLARGINE 10 UNITS: 100 INJECTION, SOLUTION SUBCUTANEOUS at 13:27

## 2019-07-14 RX ADMIN — POLYETHYLENE GLYCOL 3350 17 G: 17 POWDER, FOR SOLUTION ORAL at 09:48

## 2019-07-14 RX ADMIN — LOSARTAN POTASSIUM 25 MG: 25 TABLET ORAL at 21:58

## 2019-07-14 RX ADMIN — OXYCODONE HYDROCHLORIDE AND ACETAMINOPHEN 1 TABLET: 5; 325 TABLET ORAL at 10:50

## 2019-07-14 RX ADMIN — INSULIN LISPRO 9 UNITS: 100 INJECTION, SOLUTION INTRAVENOUS; SUBCUTANEOUS at 07:48

## 2019-07-14 RX ADMIN — Medication 10 ML: at 14:00

## 2019-07-14 RX ADMIN — Medication 10 ML: at 22:09

## 2019-07-14 RX ADMIN — Medication 10 ML: at 05:52

## 2019-07-14 RX ADMIN — INSULIN LISPRO 6 UNITS: 100 INJECTION, SOLUTION INTRAVENOUS; SUBCUTANEOUS at 12:22

## 2019-07-14 NOTE — PROGRESS NOTES
Received bedside report from [address]. Patient alert and oriented with family at bedside. Patient with L UE in sling. Edema noted to LUE. Patient with breakfast at bedside. SUMMARY- Patient c/o constipation. Patient given suppository this shift with small bowel movement after. Left eyebrow stitches removed per doctor order. Stitches removed without any bleeding noted after. Patient's LUE remains with intact sensation and circulation. Per PT patient should only be getting up with therapy and not with other staff. Patient continues with limited movement of L LE and pain when trying to move it. Patient Vitals for the past 12 hrs:   Temp Pulse Resp BP SpO2   07/14/19 1524 98.2 °F (36.8 °C) 96 16 129/47 94 %   07/14/19 0703 97.6 °F (36.4 °C) 95 16 126/64 97 %      Bedside and Verbal shift change report given to Mulugeta De Anda RN (oncoming nurse) by Randolph Ospina RN (offgoing nurse). Report included the following information SBAR, Kardex, Intake/Output, MAR and Recent Results.

## 2019-07-14 NOTE — PROGRESS NOTES
Problem: Mobility Impaired (Adult and Pediatric)  Goal: *Acute Goals and Plan of Care (Insert Text)  Description  Physical Therapy Goals  Initiated 7/12/2019  to be met within 7 days    1. Bed mobility:  Supine to/from sit with mod assist in prep for ADL activity. 2. Transfers:  Sit to/from stand with min/mod assist/fiorella-walker in prep for gait. 3. Tolerate sitting up in chair >30min for functional activity performance. 4. Ambulation:  Ambulate 50 ft. with min assist/fiorella-walker for increased functional mobility. 5. Patient Education:  Performance of therapeutic exercises for strengthening LE's with supervision for ability to achieve above goals. Outcome: Progressing Towards Goal   PHYSICAL THERAPY TREATMENT    Patient: Yvonne Hagan (74 y.o. male)  Date: 7/14/2019  Diagnosis: Humerus fracture [S42.309A]  Closed bicondylar fracture of distal end of left humerus [S42.492A]  Closed bicondylar fracture of distal end of left humerus [S42.492A] <principal problem not specified>  Procedure(s) (LRB):  LEFT TOTAL ELBOW REPLACEMENT WITH C-ARM,  \"SPEC POP\" (Left) 3 Days Post-Op  Precautions: Fall, NWB(LUE)   Chart, physical therapy assessment, plan of care and goals were reviewed. ASSESSMENT:  Pt just received suppository and requesting assistance to [facility]. Pt continues to require Max/Total A for all mobility. Pt also having difficulty advancing BLEs due to L hip pain. Cont POC. Progression toward goals:  ?      Improving appropriately and progressing toward goals  ? Improving slowly and progressing toward goals  ? Not making progress toward goals and plan of care will be adjusted     PLAN:  Patient continues to benefit from skilled intervention to address the above impairments. Continue treatment per established plan of care. Discharge Recommendations:  Rehab  Further Equipment Recommendations for Discharge:  hospital bed     SUBJECTIVE:   Patient stated ?  Can we put the sling on ? OBJECTIVE DATA SUMMARY:   Critical Behavior:  Neurologic State: Alert  Orientation Level: Oriented X4  Cognition: Follows commands, Appropriate decision making, Appropriate for age attention/concentration  Safety/Judgement: Fall prevention  Functional Mobility Training:  Bed Mobility:  Rolling: Maximum assistance; Total assistance  Supine to Sit: Maximum assistance; Total assistance  Sit to Supine: Maximum assistance; Total assistance  Scooting: Maximum assistance; Total assistance  Transfers:  Sit to Stand: Maximum assistance; Total assistance  Stand to Sit: Maximum assistance; Total assistance  Bed to Chair: Maximum assistance; Total assistance    Balance:  Sitting: Impaired  Sitting - Static: Good (unsupported)  Sitting - Dynamic: Fair (occasional)  Standing: Impaired; With support  Standing - Static: Poor  Standing - Dynamic : Poor      Pain:  Pain Scale 1: Numeric (0 - 10)  Pain Intensity 1: 8  Pain Location 1: Arm; Hip  Pain Orientation 1: Left     Pain Intervention(s) 1: Medication (see MAR)  Activity Tolerance:   Fair-    After treatment:   ? Patient left in no apparent distress sitting up in chair  ? Patient left in no apparent distress in bedside commode   ? Call bell left within reach  ? Nursing notified  ? Caregiver present  ?  Bed alarm activated      Roxy Moreland PTA   Time Calculation: 25 mins

## 2019-07-14 NOTE — PROGRESS NOTES
1920 Bedside shift report received from Camryn Bryant RN. Patient alert and oriented x4 with family at [facility]. Outgoing RN gave Percocet PRN for hand and elbow pain. Patient and family refused Dulcolax Suppository. Will continue to monitor. 2039 assessment done at this time. Left hand and elbow with  Cast padding and elastic bandage CDI. Left hand 3+ non-pitting edema kept elevated. 2158 elevated left hand with pillows. No signs of distress. Call bell within reach. Shift Summary: assisted patient to Bedside commode, patient had been urinating using the urinal. Advised patient to keep left hand/elbow elevated. Patient refused ice pack, stated it makes patient cold. Bed alarm on. Reminded patient to call before getting up. Call bell within reach.

## 2019-07-14 NOTE — PROGRESS NOTES
Problem: Falls - Risk of  Goal: *Absence of Falls  Description  Document Alexandre Nathaniel Fall Risk and appropriate interventions in the flowsheet. Outcome: Progressing Towards Goal     Problem: Pressure Injury - Risk of  Goal: *Prevention of pressure injury  Description  Document Ethan Scale and appropriate interventions in the flowsheet. Outcome: Progressing Towards Goal     Problem: Diabetes Self-Management  Goal: *Disease process and treatment process  Description  Define diabetes and identify own type of diabetes; list 3 options for treating diabetes. Outcome: Progressing Towards Goal  Goal: *Monitoring blood glucose, interpreting and using results  Description  Identify recommended blood glucose targets  and personal targets.   Outcome: Progressing Towards Goal     Problem: Pain  Goal: *Control of Pain  Outcome: Progressing Towards Goal

## 2019-07-14 NOTE — PROGRESS NOTES
Progress Note     Patient: Dyana Thurston MRN: [id number]  SSN: [id number]    YOB: 1931  Age: 80 y.o. Sex: male      POD:    2 Days Post-Op  S/P:    Procedure(s):  LEFT TOTAL ELBOW REPLACEMENT WITH C-ARM,  \"SPEC POP\"     Subjective:   Pt is with complaints of pain and nausea this morning     Objective:     Patient Vitals for the past 12 hrs:   BP Temp Pulse Resp SpO2   07/14/19 0703 126/64 97.6 °F (36.4 °C) 95 16 97 %   07/14/19 0256 124/59 97.8 °F (36.6 °C) 91 16 98 %   07/13/19 2340 124/55 97.8 °F (36.6 °C) 93 18 96 %   07/13/19 2158 138/60  96       Recent Labs     07/12/19  0851   HGB 8.2*   HCT 24.3*   *   K 4.3      CO2 29   BUN 23*   CREA 0.92   *       Pt. resting in bed. Upper extremity operative dressing clean/dry/intact; patient in orthoglass extension splint and ACE wrap; hand with significant swelling--neurovascularly intact. Assessment:     Awake and alert. In good spirits. Plan:   1. Continue pain management/ ice to operative area. 2. Continue to progress with PT/OT. 3. Patient is to remain in extension splint with no use left arm. 4. Continue ice and elevation of the LUE to help reduce swelling. 5. Discharge planning to SNF when approved by insurance (as per CM notes possibly today).

## 2019-07-14 NOTE — PROGRESS NOTES
Problem: Mobility Impaired (Adult and Pediatric)  Goal: *Acute Goals and Plan of Care (Insert Text)  Description  Physical Therapy Goals  Initiated 7/12/2019  to be met within 7 days    1. Bed mobility:  Supine to/from sit with mod assist in prep for ADL activity. 2. Transfers:  Sit to/from stand with min/mod assist/fiorella-walker in prep for gait. 3. Tolerate sitting up in chair >30min for functional activity performance. 4. Ambulation:  Ambulate 50 ft. with min assist/fiorella-walker for increased functional mobility. 5. Patient Education:  Performance of therapeutic exercises for strengthening LE's with supervision for ability to achieve above goals. 7/14/2019 1530 by Bernadette Leblanc, PTA  Outcome: Progressing Towards Goal  7/14/2019 1112 by Bernadette Leblanc PTA  Outcome: Progressing Towards Goal   PHYSICAL THERAPY TREATMENT    Patient: Becki Jimenez (84 y.o. male)  Date: 7/14/2019  Diagnosis: Humerus fracture [S42.309A]  Closed bicondylar fracture of distal end of left humerus [S42.492A]  Closed bicondylar fracture of distal end of left humerus [S42.492A] <principal problem not specified>  Procedure(s) (LRB):  LEFT TOTAL ELBOW REPLACEMENT WITH C-ARM,  \"SPEC POP\" (Left) 3 Days Post-Op  Precautions: Fall, NWB(LUE)   Chart, physical therapy assessment, plan of care and goals were reviewed. ASSESSMENT:  Pt requesting assistance back to bed from [facility]. Pt had small BM. Nursing present and assisted with diogenes care. Pt continues to require Max/Total A x2 for SPT back to bed. Moderate pain reported. Cont POC. Frequency changed to Q/BID  Progression toward goals:  ?      Improving appropriately and progressing toward goals  ? Improving slowly and progressing toward goals  ? Not making progress toward goals and plan of care will be adjusted     PLAN:  Patient continues to benefit from skilled intervention to address the above impairments.   Continue treatment per established plan of care.  Discharge Recommendations:  Rehab  Further Equipment Recommendations for Discharge:  hospital bed     SUBJECTIVE:   Patient stated  I still feel like I need to go     OBJECTIVE DATA SUMMARY:   Critical Behavior:  Neurologic State: Alert  Orientation Level: Oriented X4  Cognition: Follows commands, Appropriate decision making, Appropriate for age attention/concentration  Safety/Judgement: Fall prevention  Functional Mobility Training:  Bed Mobility:  Rolling: Maximum assistance; Total assistance  Supine to Sit: Maximum assistance; Total assistance  Sit to Supine: Maximum assistance; Total assistance  Scooting: Maximum assistance; Total assistance    Transfers:  Sit to Stand: Maximum assistance; Total assistance  Stand to Sit: Maximum assistance; Total assistance  Bed to Chair: Maximum assistance; Total assistance    Balance:  Sitting: Impaired  Sitting - Static: Good (unsupported)  Sitting - Dynamic: Fair (occasional)  Standing: Impaired; With support  Standing - Static: Poor  Standing - Dynamic : Poor    Pain:  Pain Scale 1: Numeric (0 - 10)  Pain Intensity 1: 0  Pain Location 1: Arm; Hip  Pain Orientation 1: Left  Pain Intervention(s) 1: Medication (see MAR)  Activity Tolerance:   Poor     After treatment:   ? Patient left in no apparent distress sitting up in chair  ? Patient left in no apparent distress in bed  ? Call bell left within reach  ? Nursing notified  ? Caregiver present  ?  Bed alarm activated      Alina Cardona PTA   Time Calculation: 26 mins

## 2019-07-15 VITALS
WEIGHT: 155 LBS | OXYGEN SATURATION: 99 % | BODY MASS INDEX: 24.33 KG/M2 | RESPIRATION RATE: 16 BRPM | TEMPERATURE: 98.5 F | SYSTOLIC BLOOD PRESSURE: 147 MMHG | HEART RATE: 98 BPM | DIASTOLIC BLOOD PRESSURE: 69 MMHG | HEIGHT: 67 IN

## 2019-07-15 LAB
GLUCOSE BLD STRIP.AUTO-MCNC: 220 MG/DL (ref 70–110)
GLUCOSE BLD STRIP.AUTO-MCNC: 246 MG/DL (ref 70–110)

## 2019-07-15 PROCEDURE — 74011636637 HC RX REV CODE- 636/637: Performed by: ORTHOPAEDIC SURGERY

## 2019-07-15 PROCEDURE — 82962 GLUCOSE BLOOD TEST: CPT

## 2019-07-15 PROCEDURE — 74011250637 HC RX REV CODE- 250/637: Performed by: PHYSICIAN ASSISTANT

## 2019-07-15 PROCEDURE — 74011250637 HC RX REV CODE- 250/637: Performed by: ORTHOPAEDIC SURGERY

## 2019-07-15 PROCEDURE — 97530 THERAPEUTIC ACTIVITIES: CPT

## 2019-07-15 PROCEDURE — 97116 GAIT TRAINING THERAPY: CPT

## 2019-07-15 RX ORDER — HYDROMORPHONE HYDROCHLORIDE 1 MG/ML
.2-.5 INJECTION, SOLUTION INTRAMUSCULAR; INTRAVENOUS; SUBCUTANEOUS
Status: DISCONTINUED | OUTPATIENT
Start: 2019-07-15 | End: 2019-07-15 | Stop reason: HOSPADM

## 2019-07-15 RX ORDER — INSULIN GLARGINE 100 [IU]/ML
10 INJECTION, SOLUTION SUBCUTANEOUS EVERY 24 HOURS
Status: DISCONTINUED | OUTPATIENT
Start: 2019-07-15 | End: 2019-07-15 | Stop reason: HOSPADM

## 2019-07-15 RX ORDER — OXYCODONE AND ACETAMINOPHEN 5; 325 MG/1; MG/1
1-2 TABLET ORAL
Status: DISCONTINUED | OUTPATIENT
Start: 2019-07-15 | End: 2019-07-15 | Stop reason: HOSPADM

## 2019-07-15 RX ADMIN — INSULIN LISPRO 6 UNITS: 100 INJECTION, SOLUTION INTRAVENOUS; SUBCUTANEOUS at 08:45

## 2019-07-15 RX ADMIN — ASPIRIN 81 MG: 81 TABLET, COATED ORAL at 10:25

## 2019-07-15 RX ADMIN — INSULIN LISPRO 6 UNITS: 100 INJECTION, SOLUTION INTRAVENOUS; SUBCUTANEOUS at 11:56

## 2019-07-15 RX ADMIN — POLYETHYLENE GLYCOL 3350 17 G: 17 POWDER, FOR SOLUTION ORAL at 10:46

## 2019-07-15 RX ADMIN — OXYCODONE HYDROCHLORIDE AND ACETAMINOPHEN 1 TABLET: 5; 325 TABLET ORAL at 09:02

## 2019-07-15 RX ADMIN — OXYCODONE HYDROCHLORIDE AND ACETAMINOPHEN 1 TABLET: 5; 325 TABLET ORAL at 12:40

## 2019-07-15 RX ADMIN — FINASTERIDE 5 MG: 5 TABLET, FILM COATED ORAL at 10:25

## 2019-07-15 RX ADMIN — Medication 10 ML: at 06:09

## 2019-07-15 RX ADMIN — DOCUSATE SODIUM 100 MG: 100 CAPSULE, LIQUID FILLED ORAL at 10:46

## 2019-07-15 NOTE — PROGRESS NOTES
0750-Called Pharmacy because I cant pull meds for pt off Pyxis. 800-called supervisor about pyxis problem. 0810-Family requested pain medication for pt explained that pharmacy and supervisor was contacted to fix pyxis. 0831-CM said pt can go to [place] today, will set up transport for around 12:30 today. 0837-Called Pharmacy 2nd time. Spoke to DON report given Alecia. 1231-Called [place] 2nd time to give report.

## 2019-07-15 NOTE — PROGRESS NOTES
Plan: [facility]; call report to [phone]. Please include all hard scripts for controlled substances, med rec and dc summary in packet. Please medicate for pain prior to dc if possible and needed to help offset delay when patient first arrives to facility. Pt will need medical transport, pt and family aware pt may incur cost for transport. Pt has been approved for transition to [facility] for today: per [facility] ref# [id number]. Spoke with RN who will contact MD for dc order, updated dc summary. Spoke with Digna at [facility] to alert that pt and family decline LTSS at this time. She is making sure they are able to assist without [employer] screening. Per guidelines, pt can refuse, documented by this CM in note on 7/12. Transport after lunch. Spoke with daughter tSu Sims to let her know plan. Will speak with pt once on unit. Care Management Interventions  PCP Verified by CM:  Yes  Transition of Care Consult (CM Consult): SNF  Partner SNF: No  Reason Why Partner SNF Not Chosen: Friend/family recommendation([facility])  Discharge Durable Medical Equipment: No  Physical Therapy Consult: Yes  Occupational Therapy Consult: Yes  Current Support Network: Lives Alone, Relative's Home  Confirm Follow Up Transport: Family  Plan discussed with Pt/Family/Caregiver: Yes  Freedom of Choice Offered: Yes  Discharge Location  Discharge Placement: Skilled nursing facility

## 2019-07-15 NOTE — PROGRESS NOTES
Problem: Mobility Impaired (Adult and Pediatric)  Goal: *Acute Goals and Plan of Care (Insert Text)  Description  Physical Therapy Goals  Initiated 7/12/2019  to be met within 7 days    1. Bed mobility:  Supine to/from sit with mod assist in prep for ADL activity. 2. Transfers:  Sit to/from stand with min/mod assist/fiorella-walker in prep for gait. 3. Tolerate sitting up in chair >30min for functional activity performance. 4. Ambulation:  Ambulate 50 ft. with min assist/fiorella-walker for increased functional mobility. 5. Patient Education:  Performance of therapeutic exercises for strengthening LE's with supervision for ability to achieve above goals. Outcome: Progressing Towards Goal   PHYSICAL THERAPY TREATMENT    Patient: Salvador Yeung (89 y.o. male)  Date: 7/15/2019  Diagnosis: Humerus fracture [S42.309A]  Closed bicondylar fracture of distal end of left humerus [S42.492A]  Closed bicondylar fracture of distal end of left humerus [S42.492A] <principal problem not specified>  Procedure(s) (LRB):  LEFT TOTAL ELBOW REPLACEMENT WITH C-ARM,  \"SPEC POP\" (Left) 4 Days Post-Op  Precautions: Fall, NWB(LUE)   Chart, physical therapy assessment, plan of care and goals were reviewed. ASSESSMENT:  Pt motivated to participate and use BSC. No progress noted to above goals. Pt unable to have BM. Then, applied brief, and assisted pt back to supine. Positioned for comfort. Pt to dc soon to rehab. Cont POC. Progression toward goals:  ?      Improving appropriately and progressing toward goals  ? Improving slowly and progressing toward goals  ? Not making progress toward goals and plan of care will be adjusted     PLAN:  Patient continues to benefit from skilled intervention to address the above impairments. Continue treatment per established plan of care.   Discharge Recommendations:  Rehab  Further Equipment Recommendations for Discharge:  hospital bed and mechanical lift     SUBJECTIVE:   Patient stated ? I think I want to go over in my gown? OBJECTIVE DATA SUMMARY:   Critical Behavior:  Neurologic State: Alert  Orientation Level: Oriented to person, Oriented to place, Oriented to situation  Cognition: Follows commands  Safety/Judgement: Fall prevention  Functional Mobility Training:  Bed Mobility:  Rolling: Moderate assistance  Supine to Sit: Moderate assistance  Sit to Supine: Moderate assistance  Scooting: Moderate assistance    Transfers:  Sit to Stand: Maximum assistance  Stand to Sit: Maximum assistance    Bed to Chair: Maximum assistance(to [facility])    Balance:  Sitting: Impaired  Sitting - Static: Good (unsupported)  Sitting - Dynamic: Fair (occasional)  Standing: Impaired; With support  Standing - Static: Poor  Standing - Dynamic : Poor  Ambulation/Gait Training:  Distance (ft): 2 Feet (ft)  Assistive Device: (HHA on R. )  Ambulation - Level of Assistance: Maximum assistance; Total assistance  Gait Abnormalities: Decreased step clearance;Shuffling gait  Base of Support: Narrowed; Center of gravity altered  Speed/Lexie: Pace decreased (<100 feet/min)  Step Length: Left shortened;Right shortened  Swing Pattern: Right asymmetrical;Left asymmetrical    Pain:  Pain Scale 1: Numeric (0 - 10)  Pain Intensity 1: 5  Pain Description 1: Aching  Pain Intervention(s) 1: Medication (see MAR)  Activity Tolerance:   Fair     After treatment:   ? Patient left in no apparent distress sitting up in chair  ? Patient left in no apparent distress in bed  ? Call bell left within reach  ? Nursing notified  ? Caregiver present  ?  Bed alarm activated      Sara Chao PTA   Time Calculation: 45 mins

## 2019-07-15 NOTE — PROGRESS NOTES
Progress Note     Patient: Lidia Brown MRN: [id number]  SSN: [id number]    YOB: 1931  Age: 80 y.o. Sex: male      POD:    3 Days Post-Op  S/P:    Procedure(s):  LEFT TOTAL ELBOW REPLACEMENT WITH C-ARM,  \"SPEC POP\"     Subjective:   No acute events overnight. Pt is with complaints of hip > elbow pain. The steroid injection on Friday did not seem to help. Nausea better this morning     Objective:     Patient Vitals for the past 12 hrs:   BP Temp Pulse Resp SpO2   07/15/19 1100 147/69 98.5 °F (36.9 °C) 98 16 99 %   07/15/19 0724 133/59 98.3 °F (36.8 °C) 89 16 97 %   07/15/19 0324 138/62 98.6 °F (37 °C) 85 16 97 %     No results for input(s): HGB, HCT, INR, NA, K, CL, CO2, BUN, CREA, GLU, HGBEXT, HCTEXT, HGBEXT, HCTEXT in the last 72 hours. No lab exists for component: INREXT, INREXT    Pt. resting in bed. Upper extremity operative dressing clean/dry/intact; patient in orthoglass extension splint and ACE wrap; hand with significant swelling--neurovascularly intact. Assessment:     Awake and alert. In good spirits. Plan:   1. Continue pain management/ ice to operative area. 2. Continue to progress with PT/OT. 3. Patient is to remain in extension splint with no use left arm. 4. Continue ice and elevation of the LUE to help reduce swelling. 5. Discharge planning to SNF when approved by insurance (as per CM notes possibly today).

## 2019-07-15 NOTE — PROGRESS NOTES
1905 Received patient awake lying on bed with family at bedside. Patient denies pain at this time. 1925 assessment completed at this time. Left arm with cast padding and elastic bandage with breakthrough drainage, dressing reinforced. Ecchymosis and 3+ non-pitting edema  noted Bilateral upper and lower arms. Elevated left elbow with two pillows. Shift Summary: left hand/elbow kept elevated above heart level, ice pack placed for swelling. Percocet 1 tab given for pain, with relief. Call bell within reach. Kept bed alarm on.

## 2023-02-17 NOTE — ROUTINE PROCESS
1950 Bedside and Verbal shift change  Received from Sara Elizalde RN (outgoing nurse), to GRISELDA Mendes (oncoming)  Pt. Is AOX 3. IV SL, Pt. denies  pain at this time. Report included the following information SBAR, Kardex, Procedure Summary, Intake/Output, MAR, Recent Lab Results. Will resume care and monitor Pt. Condition. Pt. Educated on call bell when in need of help and assistance. Pt. verbalized understanding. Pt. Head to toe Assessment Done and documented. LUE elevated on pillows  VS within normal limit. 2100  Pt. Denies pain. 2200  Pt. Not in distress. 2300 Pt. Resting comfortably in bed, no sign of distress. 0000 Pt. Eyes closed, easily awaken. 0130 Pt. Denies pain. 0330  Pt. Resting with eyes closed, easily awaken. 0530  Pt able to rest and sleep well throughout the shift. Verbal and bedside Shift changed report given to Tuyet Aguilar RN (oncoming RN) on Pt. Condition. Report consisted of patients Situation, History, Activities, intake/output,  Background, Assessment and Recommendations(SBAR). Information from the following report(s) Kardex, order Summary, Lab results and MAR was reviewed with the receiving nurse. Opportunity for questions and clarification was provided.
6368 Bedside and Verbal shift change report given to Katia Catalan RN (oncoming nurse) by Diana Paulino RN 
 (offgoing nurse). Report included the following information SBAR, Procedure Summary, Intake/Output, MAR and Med Rec Status.
Bedside and Verbal shift change report given to GORDY Wild (oncoming nurse) by MARTY Glover RN (offgoing nurse). Report included the following information SBAR, Kardex, OR Summary, Intake/Output, MAR and Recent Results.
Bedside and Verbal shift change report given to Isael Rodriguez RN (oncoming nurse) by Aleja Schofield RN 
 (offgoing nurse). Report included the following information SBAR, OR Summary, Procedure Summary, Intake/Output, Accordion and Med Rec Status.
Home

## (undated) DEVICE — SUT VCRL + 0 27IN CT2 UD --

## (undated) DEVICE — Device

## (undated) DEVICE — STERILE POLYISOPRENE POWDER-FREE SURGICAL GLOVES WITH EMOLLIENT COATING: Brand: PROTEXIS

## (undated) DEVICE — INTENDED FOR TISSUE SEPARATION, AND OTHER PROCEDURES THAT REQUIRE A SHARP SURGICAL BLADE TO PUNCTURE OR CUT.: Brand: BARD-PARKER ® CARBON RIB-BACK BLADES

## (undated) DEVICE — SOLUTION IRRIG 3000ML LAC R FLX CONT

## (undated) DEVICE — BASIC SINGLE BASIN 1-LF: Brand: MEDLINE INDUSTRIES, INC.

## (undated) DEVICE — STRAP,POSITIONING,KNEE/BODY,FOAM,4X60": Brand: MEDLINE

## (undated) DEVICE — KENDALL SCD EXPRESS SLEEVES, KNEE LENGTH, MEDIUM: Brand: KENDALL SCD

## (undated) DEVICE — 3L THIN WALL CAN: Brand: CRD

## (undated) DEVICE — MAYO STAND COVER: Brand: CONVERTORS

## (undated) DEVICE — SUTURE MCRYL SZ 2-0 L36IN ABSRB UD L36MM CT-1 1/2 CIR Y945H

## (undated) DEVICE — 3.0MM PRECISION NEURO (MATCH HEAD)

## (undated) DEVICE — GOWN,SIRUS,NONRNF,SETINSLV,XL,20/CS: Brand: MEDLINE

## (undated) DEVICE — TRAY PREP DRY W/ PREM GLV 2 APPL 6 SPNG 2 UNDPD 1 OVERWRAP

## (undated) DEVICE — OCCLUSIVE GAUZE STRIP,3% BISMUTH TRIBROMOPHENATE IN PETROLATUM BLEND: Brand: XEROFORM

## (undated) DEVICE — SUTURE FIBERWIRE SZ 2 W/ TAPERED NEEDLE BLUE L38IN NONABSORB BLU L26.5MM 1/2 CIRCLE AR7200

## (undated) DEVICE — DRAPE TWL SURG 16X26IN BLU ORB04] ALLCARE INC]

## (undated) DEVICE — REM POLYHESIVE ADULT PATIENT RETURN ELECTRODE: Brand: VALLEYLAB

## (undated) DEVICE — INTENDED FOR TISSUE SEPARATION, AND OTHER PROCEDURES THAT REQUIRE A SHARP SURGICAL BLADE TO PUNCTURE OR CUT.: Brand: BARD-PARKER SAFETY BLADES SIZE 10, STERILE

## (undated) DEVICE — INTENT TO BE USED WITH SUTURE MATERIAL FOR TISSUE CLOSURE: Brand: RICHARD-ALLAN® NEEDLE 1/2 CIRCLE TAPER

## (undated) DEVICE — PADDING CAST W4INXL4YD ST COT COHESIVE HND TEARABLE SPEC

## (undated) DEVICE — 3 BONE CEMENT MIXER: Brand: MIXEVAC

## (undated) DEVICE — CONCISE CEMENT SCULPS KIT: Brand: CONCISE

## (undated) DEVICE — DRAPE C-ARMOUR C-ARM KIT --

## (undated) DEVICE — TOWEL,OR,DSP,ST,BLUE,STD,4/PK,20PK/CS: Brand: MEDLINE

## (undated) DEVICE — BANDAGE COMPR W4INXL15FT BGE E SGL LAYERED CLP CLSR

## (undated) DEVICE — STERILE POLYISOPRENE POWDER-FREE SURGICAL GLOVES: Brand: PROTEXIS

## (undated) DEVICE — SUTURE VCRL + SZ 1 L18IN ABSRB UD L36MM CT-1 1/2 CIR VCP841D

## (undated) DEVICE — 3M™ STERI-DRAPE™ U-DRAPE 1015: Brand: STERI-DRAPE™

## (undated) DEVICE — HANDPIECE SET WITH FAN SPRAY TIP: Brand: INTERPULSE

## (undated) DEVICE — PACK PROCEDURE SURG SHLDR ORTHOPEDIC CUST

## (undated) DEVICE — NEEDLE HYPO 18GA L1.5IN PNK POLYPR HUB S STL THN WALL FILL

## (undated) DEVICE — 3M™ IOBAN™ 2 ANTIMICROBIAL INCISE DRAPE 6640EZ: Brand: IOBAN™ 2

## (undated) DEVICE — DISPOSABLE TOURNIQUET CUFF SINGLE BLADDER, SINGLE PORT AND QUICK CONNECT CONNECTOR: Brand: COLOR CUFF

## (undated) DEVICE — SURGIFOAM SPNG SZ 100

## (undated) DEVICE — BANDAGE COMPR W4INXL5YD ELAS CLP CLSR

## (undated) DEVICE — SUT VCRL + 2-0 36IN CT1 UD --

## (undated) DEVICE — SPONGE GZ W4XL4IN COT 12 PLY TYP VII WVN C FLD DSGN

## (undated) DEVICE — (D)GLOVE SURG TRIFLX 6 PWD LTX -- DISC BY MFR USE ITEM 102004